# Patient Record
Sex: MALE | Race: WHITE | Employment: OTHER | URBAN - NONMETROPOLITAN AREA
[De-identification: names, ages, dates, MRNs, and addresses within clinical notes are randomized per-mention and may not be internally consistent; named-entity substitution may affect disease eponyms.]

---

## 2018-08-30 ENCOUNTER — OFFICE VISIT (OUTPATIENT)
Dept: PODIATRY | Age: 83
End: 2018-08-30
Payer: MEDICARE

## 2018-08-30 VITALS
SYSTOLIC BLOOD PRESSURE: 126 MMHG | HEART RATE: 68 BPM | RESPIRATION RATE: 20 BRPM | BODY MASS INDEX: 25.98 KG/M2 | DIASTOLIC BLOOD PRESSURE: 78 MMHG | HEIGHT: 69 IN | WEIGHT: 175.4 LBS

## 2018-08-30 DIAGNOSIS — G62.9 NEUROPATHY: ICD-10-CM

## 2018-08-30 DIAGNOSIS — L97.521 SKIN ULCER OF LEFT GREAT TOE, LIMITED TO BREAKDOWN OF SKIN (HCC): Primary | ICD-10-CM

## 2018-08-30 PROCEDURE — 99202 OFFICE O/P NEW SF 15 MIN: CPT | Performed by: PODIATRIST

## 2018-08-30 PROCEDURE — 97597 DBRDMT OPN WND 1ST 20 CM/<: CPT | Performed by: PODIATRIST

## 2018-08-30 RX ORDER — APIXABAN 5 MG/1
TABLET, FILM COATED ORAL
Refills: 8 | COMMUNITY
Start: 2018-08-20 | End: 2018-08-30 | Stop reason: SDUPTHER

## 2018-08-30 RX ORDER — LISINOPRIL 40 MG/1
TABLET ORAL
COMMUNITY
Start: 2018-08-27

## 2018-08-30 RX ORDER — ATORVASTATIN CALCIUM 40 MG/1
TABLET, FILM COATED ORAL
Refills: 2 | COMMUNITY
Start: 2018-07-25

## 2018-08-30 RX ORDER — METOPROLOL SUCCINATE 25 MG/1
TABLET, EXTENDED RELEASE ORAL
COMMUNITY
Start: 2018-01-08

## 2018-08-30 RX ORDER — FINASTERIDE 5 MG/1
5 TABLET, FILM COATED ORAL DAILY
COMMUNITY

## 2018-08-30 RX ORDER — LISINOPRIL 40 MG/1
40 TABLET ORAL
COMMUNITY
End: 2018-08-30 | Stop reason: SDUPTHER

## 2018-08-30 RX ORDER — ATORVASTATIN CALCIUM 40 MG/1
40 TABLET, FILM COATED ORAL
COMMUNITY
Start: 2018-04-30 | End: 2018-08-30 | Stop reason: SDUPTHER

## 2018-08-30 NOTE — PROGRESS NOTES
Subjective:    Amber Alcantar is a 80 y.o. male who presents with the ulceration of the foot. Patient has not been compliant with off loading. Noticed draiange at home a couple weeks ago. Patient relates pain is Absent . Pain is rated 0 out of 10 and is described as none. Currently denies F/C/N/V. Allergies   Allergen Reactions    Sulfa Antibiotics      Other reaction(s): Intolerance-unknown    Sulfamethoxazole      Other reaction(s): Intolerance-unknown    Trimethoprim      Other reaction(s): Intolerance-unknown    Warfarin      Other reaction(s): Bleeding (non-specific)       Past Medical History:   Diagnosis Date    Atrial fibrillation ([facility] [address]     HTN (hypertension)     Hyperlipemia        Prior to Admission medications    Medication Sig Start Date End Date Taking? Authorizing Provider   Calcium Carb-Cholecalciferol 600-800 MG-UNIT CHEW    Yes Historical Provider, MD   metoprolol succinate (TOPROL XL) 25 MG extended release tablet TAKE 1/2 TABLET BY MOUTH ONE TIME A DAY  1/8/18  Yes Historical Provider, MD   Omega-3 Fatty Acids (FISH OIL PO) Take 2 g by mouth   Yes Historical Provider, MD   atorvastatin (LIPITOR) 40 MG tablet TAKE 1 TABLET BY MOUTH ONE TIME A DAY 7/25/18  Yes Historical Provider, MD   lisinopril (PRINIVIL;ZESTRIL) 40 MG tablet  8/27/18  Yes Historical Provider, MD   apixaban (ELIQUIS) 5 MG TABS tablet Take by mouth 2 times daily   Yes Historical Provider, MD   finasteride (PROSCAR) 5 MG tablet Take 5 mg by mouth daily   Yes Historical Provider, MD       Social History   Substance Use Topics    Smoking status: Former Smoker    Smokeless tobacco: Never Used    Alcohol use No       Review of Systems: All 12 systems reviewed and pertinent positives noted above. Lower Extremity Physical Examination:     Vitals:   Vitals:    08/30/18 1504   BP: 126/78   Pulse: 68   Resp: 20     General: AAO x 3 in NAD.      Vascular: DP and PT pulses palpable 2/4, bilateral.  CFT <3 seconds, bilateral.

## 2018-08-30 NOTE — PATIENT INSTRUCTIONS
Discontinue previous dressing. Apply Silvadene, an antimicrobial cream which is a prescription medicine. Apply once or twice a day to your wound as instructed by your doctor. Cover with a dry dressing. Call with any concerns. SIGNS OF INFECTION  - Redness, swelling, skin hot  - Wound bed turns black or stringy yellow  - Foul odor  - Increased drainage or pus  - Increased pain  - Fever greater than 100F    CALL YOUR DOCTOR OR SEEK MEDICAL ATTENTION IF SIGNS OF INFECTION.   DO NOT WAIT UNTIL YOUR NEXT APPOINTMENT    Call the Wound Care Nurse with any other questions or concerns- [phone]

## 2018-09-14 ENCOUNTER — OFFICE VISIT (OUTPATIENT)
Dept: PODIATRY | Age: 83
End: 2018-09-14
Payer: MEDICARE

## 2018-09-14 VITALS
HEIGHT: 69 IN | HEART RATE: 60 BPM | WEIGHT: 176.8 LBS | TEMPERATURE: 97.6 F | SYSTOLIC BLOOD PRESSURE: 126 MMHG | DIASTOLIC BLOOD PRESSURE: 68 MMHG | BODY MASS INDEX: 26.19 KG/M2 | RESPIRATION RATE: 20 BRPM

## 2018-09-14 DIAGNOSIS — L97.521 SKIN ULCER OF LEFT GREAT TOE, LIMITED TO BREAKDOWN OF SKIN (HCC): ICD-10-CM

## 2018-09-14 DIAGNOSIS — G62.9 NEUROPATHY: Primary | ICD-10-CM

## 2018-09-14 PROCEDURE — 99212 OFFICE O/P EST SF 10 MIN: CPT | Performed by: PODIATRIST

## 2018-09-14 NOTE — PROGRESS NOTES
Subjective:    Amber Alcantar is a 80 y.o. male who presents with the ulceration of the foot. Patient has been compliant with tx. Patient relates pain is Absent . Pain is rated 0 out of 10 and is described as none. Currently denies F/C/N/V. Allergies   Allergen Reactions    Sulfa Antibiotics Other (See Comments)     Other reaction(s): Intolerance-unknown    Sulfamethoxazole      Other reaction(s): Intolerance-unknown    Sulfamethoxazole-Trimethoprim Other (See Comments)    Trimethoprim      Other reaction(s): Intolerance-unknown    Warfarin      Other reaction(s): Bleeding (non-specific)       Past Medical History:   Diagnosis Date    Atrial fibrillation ([facility] [address]     HTN (hypertension)     Hyperlipemia        Prior to Admission medications    Medication Sig Start Date End Date Taking? Authorizing Provider   Calcium Carb-Cholecalciferol 600-800 MG-UNIT CHEW    Yes Historical Provider, MD   metoprolol succinate (TOPROL XL) 25 MG extended release tablet TAKE 1/2 TABLET BY MOUTH ONE TIME A DAY  1/8/18  Yes Historical Provider, MD   Omega-3 Fatty Acids (FISH OIL PO) Take 2 g by mouth   Yes Historical Provider, MD   atorvastatin (LIPITOR) 40 MG tablet TAKE 1 TABLET BY MOUTH ONE TIME A DAY 7/25/18  Yes Historical Provider, MD   lisinopril (PRINIVIL;ZESTRIL) 40 MG tablet  8/27/18  Yes Historical Provider, MD   apixaban (ELIQUIS) 5 MG TABS tablet Take by mouth 2 times daily   Yes Historical Provider, MD   finasteride (PROSCAR) 5 MG tablet Take 5 mg by mouth daily   Yes Historical Provider, MD       Social History   Substance Use Topics    Smoking status: Former Smoker    Smokeless tobacco: Never Used    Alcohol use No       Review of Systems: All 12 systems reviewed and pertinent positives noted above. Lower Extremity Physical Examination:     Vitals:   Vitals:    09/14/18 0904   BP: 126/68   Pulse: 60   Resp: 20   Temp: 97.6 °F (36.4 °C)     General: AAO x 3 in NAD.      Vascular: DP and PT pulses palpable 2/4, bilateral.  CFT <3 seconds, bilateral.  Hair growth present to the level of the digits, bilateral.  Edema present, bilateral.  Varicosities present, bilateral. Erythema absent, bilateral. Distal Rubor absent bilateral.  Temperature within normal limits bilateral. Hyperpigmentation present bilateral. Atrophic skin yes. Neurological: Sensation Impaired to light touch to level of digits, bilateral.  Protective sensation intact  0/10 sites via 5.07/10g [place]-Lori Monofilament, bilateral.  negative Tinel's, bilateral.  negative Valleix sign, bilateral.  Vibratory abnormal  bilateral.  Reflexes Decreased bilateral.  Paresthesias positive. Dysthesias negative. Sharp/dull abnormal  bilateral.    Musculoskeletal: Muscle strength 5/5, bilateral.  Pain absent upon palpation bilateral. Normal medial longitudinal arch, bilateral.  Ankle ROM within normal limits,bilateral.  1st MPJ ROM within normal limits, bilateral.  Dorsally contracted digits absent. No other foot deformities. Integument:   Open lesion absent, Left. Sub L hallux ulcer resolved. Interdigital maceration absent to web spaces , Bilateral.  Nails left 1, 2, 3, 4, 5 and right  1, 2, 3, 4, 5 thickened, dystrophic and crumbly, discolored with subungual debris. Fissures absent, Bilateral. Hyperkeratotic tissue is present. Seen sub l hallux       Asessment: Patient is a 80 y.o. male with:    Diagnosis Orders   1. Neuropathy     2. Skin ulcer of left great toe, limited to breakdown of skin ([address]       Ulcer Classification:   ulcer grade 1 C resolved  IDSA  no infection. Plan:   Patient examined and evaluated. Diabetic foot education and exam.  Current condition and treatment options discussed in detail. No debridement needed of the ulcer today. The ulcer is completley resolved at this time. Pt was educated about means of prevention and risk factor modification. Pt should return to the clinic PRN if any further ulceration should occur.

## 2018-12-14 ENCOUNTER — OFFICE VISIT (OUTPATIENT)
Dept: PODIATRY | Age: 83
End: 2018-12-14
Payer: MEDICARE

## 2018-12-14 VITALS
WEIGHT: 176 LBS | DIASTOLIC BLOOD PRESSURE: 70 MMHG | HEIGHT: 69 IN | HEART RATE: 80 BPM | BODY MASS INDEX: 26.07 KG/M2 | SYSTOLIC BLOOD PRESSURE: 128 MMHG

## 2018-12-14 DIAGNOSIS — G62.9 NEUROPATHY: Primary | ICD-10-CM

## 2018-12-14 DIAGNOSIS — L84 CORN OF TOE: ICD-10-CM

## 2018-12-14 DIAGNOSIS — B35.1 DERMATOPHYTOSIS OF NAIL: ICD-10-CM

## 2018-12-14 PROCEDURE — 99999 PR OFFICE/OUTPT VISIT,PROCEDURE ONLY: CPT | Performed by: PODIATRIST

## 2018-12-14 PROCEDURE — 11055 PARING/CUTG B9 HYPRKER LES 1: CPT | Performed by: PODIATRIST

## 2018-12-14 PROCEDURE — 11721 DEBRIDE NAIL 6 OR MORE: CPT | Performed by: PODIATRIST

## 2019-03-14 ENCOUNTER — OFFICE VISIT (OUTPATIENT)
Dept: PODIATRY | Age: 84
End: 2019-03-14
Payer: MEDICARE

## 2019-03-14 VITALS
BODY MASS INDEX: 26.36 KG/M2 | DIASTOLIC BLOOD PRESSURE: 68 MMHG | SYSTOLIC BLOOD PRESSURE: 118 MMHG | WEIGHT: 178 LBS | HEIGHT: 69 IN | HEART RATE: 74 BPM

## 2019-03-14 DIAGNOSIS — G62.9 NEUROPATHY: Primary | ICD-10-CM

## 2019-03-14 DIAGNOSIS — L84 CORNS AND CALLOSITIES: ICD-10-CM

## 2019-03-14 DIAGNOSIS — B35.1 DERMATOPHYTOSIS OF NAIL: ICD-10-CM

## 2019-03-14 PROCEDURE — 11721 DEBRIDE NAIL 6 OR MORE: CPT | Performed by: PODIATRIST

## 2019-03-14 PROCEDURE — 11056 PARNG/CUTG B9 HYPRKR LES 2-4: CPT | Performed by: PODIATRIST

## 2019-03-14 PROCEDURE — 99999 PR OFFICE/OUTPT VISIT,PROCEDURE ONLY: CPT | Performed by: PODIATRIST

## 2019-05-23 ENCOUNTER — OFFICE VISIT (OUTPATIENT)
Dept: PODIATRY | Age: 84
End: 2019-05-23
Payer: MEDICARE

## 2019-05-23 VITALS
HEART RATE: 68 BPM | WEIGHT: 177 LBS | HEIGHT: 69 IN | BODY MASS INDEX: 26.22 KG/M2 | DIASTOLIC BLOOD PRESSURE: 60 MMHG | SYSTOLIC BLOOD PRESSURE: 115 MMHG

## 2019-05-23 DIAGNOSIS — B35.1 DERMATOPHYTOSIS OF NAIL: ICD-10-CM

## 2019-05-23 DIAGNOSIS — G62.9 NEUROPATHY: Primary | ICD-10-CM

## 2019-05-23 DIAGNOSIS — L84 CORNS AND CALLOSITIES: ICD-10-CM

## 2019-05-23 PROCEDURE — 11721 DEBRIDE NAIL 6 OR MORE: CPT | Performed by: PODIATRIST

## 2019-05-23 PROCEDURE — 11056 PARNG/CUTG B9 HYPRKR LES 2-4: CPT | Performed by: PODIATRIST

## 2019-05-23 PROCEDURE — 99999 PR OFFICE/OUTPT VISIT,PROCEDURE ONLY: CPT | Performed by: PODIATRIST

## 2019-05-23 NOTE — PROGRESS NOTES
Foot Care Worksheet  PCP: Amber Geronimo MD  Last visit: 2/15/19        Nail description:  Thick , Yellow , Crumbly , Marked limitation of ambulation     Pain resulting from thickened and dystrophy of nail plate No    Nails involved  Right   1, 2, 3, 4, 5  (T5-T9)  Left     1, 2, 3, 4, 5  (TA-T4)    Q7 1 Class A Finding - Non traumatic amputation of foot No    Q8 2 Class B Findings - Absent DP pulse No, Absent PT pulse No, Advanced tropic changes (3 required) Yes    Decrease hair growth Yes, Nail changes/thickening Yes, Pigmented changes/discoloration Yes, Skin texture (thin, shiny) No, Skin color (rubor/redness) No    Q9 1 Class B and 2 Class C Findings  Claudication No, Temperature change Yes, Paresthesia No, Burning No, Edema Yes

## 2019-05-23 NOTE — PROGRESS NOTES
Subjective:  Patient presents to [facility] today for routine foot care. Patient denies any new problems with their feet. Allergies   Allergen Reactions    Sulfa Antibiotics Other (See Comments)     Other reaction(s): Intolerance-unknown    Sulfamethoxazole      Other reaction(s): Intolerance-unknown    Sulfamethoxazole-Trimethoprim Other (See Comments)    Trimethoprim      Other reaction(s): Intolerance-unknown    Warfarin      Other reaction(s): Bleeding (non-specific)       Past Medical History:   Diagnosis Date    Atrial fibrillation ([address]     HTN (hypertension)     Hyperlipemia        Prior to Admission medications    Medication Sig Start Date End Date Taking? Authorizing Provider   Calcium Carb-Cholecalciferol 600-800 MG-UNIT CHEW    Yes Historical Provider, MD   metoprolol succinate (TOPROL XL) 25 MG extended release tablet TAKE 1/2 TABLET BY MOUTH ONE TIME A DAY  1/8/18  Yes Historical Provider, MD   Omega-3 Fatty Acids (FISH OIL PO) Take 2 g by mouth   Yes Historical Provider, MD   atorvastatin (LIPITOR) 40 MG tablet TAKE 1 TABLET BY MOUTH ONE TIME A DAY 7/25/18  Yes Historical Provider, MD   lisinopril (PRINIVIL;ZESTRIL) 40 MG tablet  8/27/18  Yes Historical Provider, MD   apixaban (ELIQUIS) 5 MG TABS tablet Take by mouth 2 times daily   Yes Historical Provider, MD   finasteride (PROSCAR) 5 MG tablet Take 5 mg by mouth daily   Yes Historical Provider, MD       Social History     Tobacco Use    Smoking status: Former Smoker    Smokeless tobacco: Never Used   Substance Use Topics    Alcohol use: No     Review of Systems: All 12 systems reviewed and pertinent positives noted above.   Objective:  Vascular: DP and PT pulses palpable 2/4, bilateral.  CFT <3 seconds, bilateral.  Hair growth present to the level of the digits, bilateral.  Edema absent, bilateral.  Varicosities absent, bilateral. Erythema absent, bilateral. Distal Rubor absent bilateral.  Temperature within normal limits bilateral. Hyperpigmentation absent bilateral. No atrophic skin. Neurological: Sensation Impaired to light touch to level of digits, bilateral.  Protective sensation intact  0/10 sites via 5.07/10g [place]-Lori Monofilament, bilateral.  negative Tinel's, bilateral.  negative Valleix sign, bilateral.  Vibratory abnormal  bilateral.  Reflexes Decreased bilateral.  Paresthesias positive. Dysthesias negative. Sharp/dull abnormal bilateral.    Integument:  Nails left 1, 2, 3, 4, 5 and right 1, 2, 3, 4, 5 thickened, dystrophic and crumbly, discolored with subungual debris. Hyperkeratotic tissue is present. Seen sub L hallux and medial R 5th toe    Assessment:   Diagnosis Orders   1. Neuropathy     2. Dermatophytosis of nail     3. Corns and callosities           Plan:  Nails as mentioned above debrided in length and thickness. Paring of 2 benign hyperkeratotic lesions took place with #10 blade or tissue nippers. Patient advised OTC methods for treatment of the mycotic nails. Patient will follow up in 10 weeks.

## 2019-10-03 ENCOUNTER — OFFICE VISIT (OUTPATIENT)
Dept: PODIATRY | Age: 84
End: 2019-10-03
Payer: MEDICARE

## 2019-10-03 VITALS
DIASTOLIC BLOOD PRESSURE: 70 MMHG | WEIGHT: 176 LBS | HEIGHT: 69 IN | BODY MASS INDEX: 26.07 KG/M2 | SYSTOLIC BLOOD PRESSURE: 112 MMHG | HEART RATE: 76 BPM

## 2019-10-03 DIAGNOSIS — B35.1 DERMATOPHYTOSIS OF NAIL: ICD-10-CM

## 2019-10-03 DIAGNOSIS — G60.8 HEREDITARY SENSORY NEUROPATHY: Primary | ICD-10-CM

## 2019-10-03 DIAGNOSIS — L84 CORNS AND CALLOSITIES: ICD-10-CM

## 2019-10-03 PROCEDURE — 99999 PR OFFICE/OUTPT VISIT,PROCEDURE ONLY: CPT | Performed by: PODIATRIST

## 2019-10-03 PROCEDURE — 11056 PARNG/CUTG B9 HYPRKR LES 2-4: CPT | Performed by: PODIATRIST

## 2019-10-03 PROCEDURE — 11721 DEBRIDE NAIL 6 OR MORE: CPT | Performed by: PODIATRIST

## 2019-12-12 ENCOUNTER — OFFICE VISIT (OUTPATIENT)
Dept: PODIATRY | Age: 84
End: 2019-12-12
Payer: MEDICARE

## 2019-12-12 VITALS
HEART RATE: 60 BPM | HEIGHT: 69 IN | DIASTOLIC BLOOD PRESSURE: 80 MMHG | WEIGHT: 176 LBS | SYSTOLIC BLOOD PRESSURE: 132 MMHG | BODY MASS INDEX: 26.07 KG/M2 | RESPIRATION RATE: 20 BRPM

## 2019-12-12 DIAGNOSIS — B35.1 DERMATOPHYTOSIS OF NAIL: ICD-10-CM

## 2019-12-12 DIAGNOSIS — G60.8 HEREDITARY SENSORY NEUROPATHY: Primary | ICD-10-CM

## 2019-12-12 PROCEDURE — 99999 PR OFFICE/OUTPT VISIT,PROCEDURE ONLY: CPT | Performed by: PODIATRIST

## 2019-12-12 PROCEDURE — 11721 DEBRIDE NAIL 6 OR MORE: CPT | Performed by: PODIATRIST

## 2020-06-04 ENCOUNTER — OFFICE VISIT (OUTPATIENT)
Dept: PODIATRY | Age: 85
End: 2020-06-04
Payer: MEDICARE

## 2020-06-04 VITALS
BODY MASS INDEX: 25.1 KG/M2 | SYSTOLIC BLOOD PRESSURE: 124 MMHG | DIASTOLIC BLOOD PRESSURE: 74 MMHG | RESPIRATION RATE: 20 BRPM | WEIGHT: 170 LBS | HEART RATE: 68 BPM

## 2020-06-04 PROCEDURE — 99213 OFFICE O/P EST LOW 20 MIN: CPT | Performed by: PODIATRIST

## 2020-06-04 PROCEDURE — 99214 OFFICE O/P EST MOD 30 MIN: CPT

## 2020-06-04 PROCEDURE — 97597 DBRDMT OPN WND 1ST 20 CM/<: CPT | Performed by: PODIATRIST

## 2020-06-04 PROCEDURE — 11721 DEBRIDE NAIL 6 OR MORE: CPT | Performed by: PODIATRIST

## 2020-06-04 NOTE — PROGRESS NOTES
Subjective:    Mariposa Burns is a 80 y.o. male who presents with the ulceration of the L toe. Pt has not been seen and missed appt due to COVID 19 pandemic. Lali Rivera Patient has not been compliant with off loading. Patient relates pain is Absent . Pain is rated 0 out of 10 and is described as none. Currently denies F/C/N/V. Pt also presents for routine foot care. Overall diabetic control is not changed. Allergies   Allergen Reactions    Sulfa Antibiotics Other (See Comments)     Other reaction(s): Intolerance-unknown  Other reaction(s): Unknown    Sulfamethoxazole      Other reaction(s): Intolerance-unknown    Sulfamethoxazole-Trimethoprim Other (See Comments)     Other reaction(s): Unknown    Trimethoprim      Other reaction(s): Intolerance-unknown    Warfarin      Other reaction(s): Bleeding (non-specific)       Past Medical History:   Diagnosis Date    Atrial fibrillation ([facility] [address]     HTN (hypertension)     Hyperlipemia        Prior to Admission medications    Medication Sig Start Date End Date Taking? Authorizing Provider   mupirocin (BACTROBAN) 2 % ointment Apply topically 2 times daily.  6/4/20  Yes Nishant Ellis DPM   Calcium Carb-Cholecalciferol 600-800 MG-UNIT CHEW    Yes Historical Provider, MD   metoprolol succinate (TOPROL XL) 25 MG extended release tablet TAKE 1/2 TABLET BY MOUTH ONE TIME A DAY  1/8/18  Yes Historical Provider, MD   Omega-3 Fatty Acids (FISH OIL PO) Take 2 g by mouth   Yes Historical Provider, MD   atorvastatin (LIPITOR) 40 MG tablet TAKE 1 TABLET BY MOUTH ONE TIME A DAY 7/25/18  Yes Historical Provider, MD   lisinopril (PRINIVIL;ZESTRIL) 40 MG tablet  8/27/18  Yes Historical Provider, MD   apixaban (ELIQUIS) 5 MG TABS tablet Take by mouth 2 times daily   Yes Historical Provider, MD   finasteride (PROSCAR) 5 MG tablet Take 5 mg by mouth daily   Yes Historical Provider, MD       Social History     Tobacco Use    Smoking status: Former Smoker    Smokeless tobacco: Never Used Substance Use Topics    Alcohol use: No       ROS: All 14 ROS systems reviewed and pertinent positives noted above, all others negative. Lower Extremity Physical Examination:     Vitals:   Vitals:    06/04/20 1608   BP: 124/74   Pulse: 68   Resp: 20     General: AAO x 3 in NAD. Vascular: DP and PT pulses palpable 2/4, bilateral.  CFT <3 seconds, bilateral.  Hair growth absent to the level of the digits, bilateral.  Edema present, bilateral.  Varicosities present, bilateral. Erythema absent, bilateral. Distal Rubor absent bilateral.  Temperature decreased bilateral. Hyperpigmentation present bilateral. Atrophic skin yes. Neurological: Sensation Impaired to light touch to level of digits, bilateral.  Protective sensation intact  0/10 sites via 5.07/10g [place]-Lori Monofilament, bilateral.  negative Tinel's, bilateral.  negative Valleix sign, bilateral.  Vibratory abnormal  bilateral.  Reflexes Decreased bilateral.  Paresthesias positive. Dysthesias negative. Sharp/dull abnormal  bilateral.    Musculoskeletal: Muscle strength 5/5, bilateral.  Pain absent upon palpation bilateral. Normal medial longitudinal arch, bilateral.  Ankle ROM within normal limits,bilateral.  1st MPJ ROM within normal limits, bilateral.  Dorsally contracted digits absent. No other foot deformities. Integument:   Open lesion present, Left. Ulcer sub L hallux, 0.9x0.4x0.2cm, aggressive postivie callous and fibrin, healthy red granular base, no probing no undermining no malodor. No surrounding signs of infx. Interdigital maceration absent to web spaces , Bilateral.  Nails left 1, 2, 3, 4, 5 and right  1, 2, 3, 4, 5 thickened, dystrophic and crumbly, discolored with subungual debris. Fissures absent, Bilateral. Hyperkeratotic tissue is present. Asessment: Patient is a 80 y.o. male with:    Diagnosis Orders   1.  Skin ulcer of left great toe, limited to breakdown of skin ([facility])  FL DEBRIDEMENT OPEN WOUND 20 SQ CM<   2.

## 2020-06-17 ENCOUNTER — OFFICE VISIT (OUTPATIENT)
Dept: PODIATRY | Age: 85
End: 2020-06-17
Payer: MEDICARE

## 2020-06-17 VITALS
SYSTOLIC BLOOD PRESSURE: 122 MMHG | HEART RATE: 68 BPM | BODY MASS INDEX: 25.18 KG/M2 | TEMPERATURE: 97.3 F | WEIGHT: 170 LBS | HEIGHT: 69 IN | DIASTOLIC BLOOD PRESSURE: 74 MMHG

## 2020-06-17 PROCEDURE — 97597 DBRDMT OPN WND 1ST 20 CM/<: CPT | Performed by: PODIATRIST

## 2020-06-17 PROCEDURE — 99999 PR OFFICE/OUTPT VISIT,PROCEDURE ONLY: CPT | Performed by: PODIATRIST

## 2020-06-17 PROCEDURE — 99213 OFFICE O/P EST LOW 20 MIN: CPT

## 2020-06-17 NOTE — PROGRESS NOTES
Subjective:    Dajuan Griffin is a 80 y.o. male who presents with the ulceration of the L toe. Patient has not been compliant with off loading. Patient relates pain is Absent . Pain is rated 0 out of 10 and is described as none. Currently denies F/C/N/V. Overall diabetic control is not changed. Allergies   Allergen Reactions    Sulfa Antibiotics Other (See Comments)     Other reaction(s): Intolerance-unknown  Other reaction(s): Unknown    Sulfamethoxazole      Other reaction(s): Intolerance-unknown    Sulfamethoxazole-Trimethoprim Other (See Comments)     Other reaction(s): Unknown    Trimethoprim      Other reaction(s): Intolerance-unknown    Warfarin      Other reaction(s): Bleeding (non-specific)       Past Medical History:   Diagnosis Date    Atrial fibrillation ([facility] [address]     HTN (hypertension)     Hyperlipemia        Prior to Admission medications    Medication Sig Start Date End Date Taking? Authorizing Provider   mupirocin (BACTROBAN) 2 % ointment Apply topically 2 times daily.  6/4/20  Yes Edgar Perez DPM   Calcium Carb-Cholecalciferol 600-800 MG-UNIT CHEW    Yes Historical Provider, MD   metoprolol succinate (TOPROL XL) 25 MG extended release tablet TAKE 1/2 TABLET BY MOUTH ONE TIME A DAY  1/8/18  Yes Historical Provider, MD   Omega-3 Fatty Acids (FISH OIL PO) Take 2 g by mouth   Yes Historical Provider, MD   atorvastatin (LIPITOR) 40 MG tablet TAKE 1 TABLET BY MOUTH ONE TIME A DAY 7/25/18  Yes Historical Provider, MD   lisinopril (PRINIVIL;ZESTRIL) 40 MG tablet  8/27/18  Yes Historical Provider, MD   apixaban (ELIQUIS) 5 MG TABS tablet Take by mouth 2 times daily   Yes Historical Provider, MD   finasteride (PROSCAR) 5 MG tablet Take 5 mg by mouth daily   Yes Historical Provider, MD       Social History     Tobacco Use    Smoking status: Former Smoker    Smokeless tobacco: Never Used   Substance Use Topics    Alcohol use: No       ROS: All 14 ROS systems reviewed and pertinent positives noted above, all others negative. Lower Extremity Physical Examination:     Vitals:   Vitals:    06/17/20 1505   BP: 122/74   Pulse: 68   Temp: 97.3 °F (36.3 °C)     General: AAO x 3 in NAD. Vascular: DP and PT pulses palpable 2/4, bilateral.  CFT <3 seconds, bilateral.  Hair growth absent to the level of the digits, bilateral.  Edema present, bilateral.  Varicosities present, bilateral. Erythema absent, bilateral. Distal Rubor absent bilateral.  Temperature decreased bilateral. Hyperpigmentation present bilateral. Atrophic skin yes. Neurological: Sensation Impaired to light touch to level of digits, bilateral.  Protective sensation intact  0/10 sites via 5.07/10g [place]-Lori Monofilament, bilateral.  negative Tinel's, bilateral.  negative Valleix sign, bilateral.  Vibratory abnormal  bilateral.  Reflexes Decreased bilateral.  Paresthesias positive. Dysthesias negative. Sharp/dull abnormal  bilateral.    Musculoskeletal: Muscle strength 5/5, bilateral.  Pain absent upon palpation bilateral. Normal medial longitudinal arch, bilateral.  Ankle ROM within normal limits,bilateral.  1st MPJ ROM within normal limits, bilateral.  Dorsally contracted digits absent. No other foot deformities. Integument:   Open lesion present, Left. Ulcer sub L hallux, postivie callous and fibrin, healthy red granular base, no probing no undermining no malodor. No surrounding signs of infx. Interdigital maceration absent to web spaces , Bilateral.  Nails left 1, 2, 3, 4, 5 and right  1, 2, 3, 4, 5 thickened, dystrophic and crumbly, discolored with subungual debris. Fissures absent, Bilateral. Hyperkeratotic tissue is present.   Wound 08/30/18 left great toe  (Active)   Wound Width (cm) 0.2 cm 8/30/2018  3:48 PM   Wound Depth (cm)  0.1 8/30/2018  3:48 PM   Calculated Wound Size (cm^2) (l*w) 0.04 cm^2 8/30/2018  3:48 PM   Number of days: 657       Wound 06/04/20 # left great toe (Active)   Dressing/Treatment Antibacterial

## 2020-06-17 NOTE — PATIENT INSTRUCTIONS
Continue with bactroban ointment to left great toe, apply once per day, keep covered with a bandage at all times.   OK to leave bandage off during showers or bathing, do not soak toe wound   Follow up with Dr Ashley Porras in 1-2 weeks

## 2020-07-01 ENCOUNTER — OFFICE VISIT (OUTPATIENT)
Dept: PODIATRY | Age: 85
End: 2020-07-01
Payer: MEDICARE

## 2020-07-01 VITALS
SYSTOLIC BLOOD PRESSURE: 122 MMHG | DIASTOLIC BLOOD PRESSURE: 64 MMHG | HEART RATE: 60 BPM | BODY MASS INDEX: 25.08 KG/M2 | WEIGHT: 169.8 LBS | RESPIRATION RATE: 20 BRPM

## 2020-07-01 PROCEDURE — 99212 OFFICE O/P EST SF 10 MIN: CPT | Performed by: PODIATRIST

## 2020-07-01 PROCEDURE — 99213 OFFICE O/P EST LOW 20 MIN: CPT

## 2020-07-01 NOTE — PROGRESS NOTES
Subjective:    Zoë Baeza is a 80 y.o. male who presents with the ulceration of the L toe. Patient has not been compliant with off loading. Patient relates pain is Absent . Pain is rated 0 out of 10 and is described as none. Currently denies F/C/N/V. Overall diabetic control is not changed. Allergies   Allergen Reactions    Sulfa Antibiotics Other (See Comments)     Other reaction(s): Intolerance-unknown  Other reaction(s): Unknown    Sulfamethoxazole      Other reaction(s): Intolerance-unknown    Sulfamethoxazole-Trimethoprim Other (See Comments)     Other reaction(s): Unknown    Trimethoprim      Other reaction(s): Intolerance-unknown    Warfarin      Other reaction(s): Bleeding (non-specific)       Past Medical History:   Diagnosis Date    Atrial fibrillation ([facility] [address]     HTN (hypertension)     Hyperlipemia        Prior to Admission medications    Medication Sig Start Date End Date Taking? Authorizing Provider   mupirocin (BACTROBAN) 2 % ointment Apply topically 2 times daily.  6/4/20  Yes Nancie Stains, DPM   Calcium Carb-Cholecalciferol 600-800 MG-UNIT CHEW    Yes Historical Provider, MD   metoprolol succinate (TOPROL XL) 25 MG extended release tablet TAKE 1/2 TABLET BY MOUTH ONE TIME A DAY  1/8/18  Yes Historical Provider, MD   Omega-3 Fatty Acids (FISH OIL PO) Take 2 g by mouth   Yes Historical Provider, MD   atorvastatin (LIPITOR) 40 MG tablet TAKE 1 TABLET BY MOUTH ONE TIME A DAY 7/25/18  Yes Historical Provider, MD   lisinopril (PRINIVIL;ZESTRIL) 40 MG tablet  8/27/18  Yes Historical Provider, MD   apixaban (ELIQUIS) 5 MG TABS tablet Take by mouth 2 times daily   Yes Historical Provider, MD   finasteride (PROSCAR) 5 MG tablet Take 5 mg by mouth daily   Yes Historical Provider, MD       Social History     Tobacco Use    Smoking status: Former Smoker    Smokeless tobacco: Never Used   Substance Use Topics    Alcohol use: No       ROS: All 14 ROS systems reviewed and pertinent positives noted above, all others negative. Lower Extremity Physical Examination:     Vitals:   Vitals:    07/01/20 1348   BP: 122/64   Pulse: 60   Resp: 20     General: AAO x 3 in NAD. Vascular: DP and PT pulses palpable 2/4, bilateral.  CFT <3 seconds, bilateral.  Hair growth absent to the level of the digits, bilateral.  Edema present, bilateral.  Varicosities present, bilateral. Erythema absent, bilateral. Distal Rubor absent bilateral.  Temperature decreased bilateral. Hyperpigmentation present bilateral. Atrophic skin yes. Neurological: Sensation Impaired to light touch to level of digits, bilateral.  Protective sensation intact  0/10 sites via 5.07/10g [place]-Lori Monofilament, bilateral.  negative Tinel's, bilateral.  negative Valleix sign, bilateral.  Vibratory abnormal  bilateral.  Reflexes Decreased bilateral.  Paresthesias positive. Dysthesias negative. Sharp/dull abnormal  bilateral.    Musculoskeletal: Muscle strength 5/5, bilateral.  Pain absent upon palpation bilateral. Normal medial longitudinal arch, bilateral.  Ankle ROM within normal limits,bilateral.  1st MPJ ROM decreased, bilateral.  Dorsally contracted digits absent. No other foot deformities. Integument:   Open lesion present, Left. Ulcer sub L hallux,resolved with new epithelail skin. . Interdigital maceration absent to web spaces , Bilateral.  Nails left 1, 2, 3, 4, 5 and right  1, 2, 3, 4, 5 thickened, dystrophic and crumbly, discolored with subungual debris. Fissures absent, Bilateral. Hyperkeratotic tissue is present  Asessment: Patient is a 80 y.o. male with:    Diagnosis Orders   1. Skin ulcer of left great toe, limited to breakdown of skin ([address]     2. Hereditary sensory neuropathy       Ulcer Classification:  Diabetic ulcer grade 1 C resolved. IDSA  no infection. Plan:   Patient examined and evaluated. Diabetic foot education and exam.  Current condition and treatment options discussed in detail.    No debridement needed of the ulcer today. The ulcer is completley resolved at this time. Pt was educated about means of prevention and risk factor modification. Pt should return to the clinic PRN if any further ulceration should occur. Contact clinic with any questions/problems/concerns or new signs of infection. Follow-up at [facility] in future for routine callous care.

## 2020-08-13 ENCOUNTER — OFFICE VISIT (OUTPATIENT)
Dept: PODIATRY | Age: 85
End: 2020-08-13
Payer: MEDICARE

## 2020-08-13 VITALS
HEART RATE: 68 BPM | RESPIRATION RATE: 20 BRPM | WEIGHT: 172 LBS | DIASTOLIC BLOOD PRESSURE: 66 MMHG | BODY MASS INDEX: 25.4 KG/M2 | SYSTOLIC BLOOD PRESSURE: 124 MMHG

## 2020-08-13 PROCEDURE — 11721 DEBRIDE NAIL 6 OR MORE: CPT | Performed by: PODIATRIST

## 2020-08-13 PROCEDURE — 99999 PR OFFICE/OUTPT VISIT,PROCEDURE ONLY: CPT | Performed by: PODIATRIST

## 2020-08-13 PROCEDURE — 11055 PARING/CUTG B9 HYPRKER LES 1: CPT | Performed by: PODIATRIST

## 2020-08-13 NOTE — PROGRESS NOTES
Subjective:  Patient presents to [facility] today for routine foot care. Patient denies any new problems with their feet. Allergies   Allergen Reactions    Sulfa Antibiotics Other (See Comments)     Other reaction(s): Intolerance-unknown  Other reaction(s): Unknown    Sulfamethoxazole      Other reaction(s): Intolerance-unknown    Sulfamethoxazole-Trimethoprim Other (See Comments)     Other reaction(s): Unknown    Trimethoprim      Other reaction(s): Intolerance-unknown    Warfarin      Other reaction(s): Bleeding (non-specific)       Past Medical History:   Diagnosis Date    Atrial fibrillation ([address]     HTN (hypertension)     Hyperlipemia        Prior to Admission medications    Medication Sig Start Date End Date Taking? Authorizing Provider   mupirocin (BACTROBAN) 2 % ointment Apply topically 2 times daily. 6/4/20  Yes Neelam Lindsay DPM   Calcium Carb-Cholecalciferol 600-800 MG-UNIT CHEW    Yes Historical Provider, MD   metoprolol succinate (TOPROL XL) 25 MG extended release tablet TAKE 1/2 TABLET BY MOUTH ONE TIME A DAY  1/8/18  Yes Historical Provider, MD   Omega-3 Fatty Acids (FISH OIL PO) Take 2 g by mouth   Yes Historical Provider, MD   atorvastatin (LIPITOR) 40 MG tablet TAKE 1 TABLET BY MOUTH ONE TIME A DAY 7/25/18  Yes Historical Provider, MD   lisinopril (PRINIVIL;ZESTRIL) 40 MG tablet  8/27/18  Yes Historical Provider, MD   apixaban (ELIQUIS) 5 MG TABS tablet Take by mouth 2 times daily   Yes Historical Provider, MD   finasteride (PROSCAR) 5 MG tablet Take 5 mg by mouth daily   Yes Historical Provider, MD       Social History     Tobacco Use    Smoking status: Former Smoker    Smokeless tobacco: Never Used   Substance Use Topics    Alcohol use: No     Review of Systems: All 12 systems reviewed and pertinent positives noted above.   Objective:  Vascular: DP and PT pulses palpable 2/4, bilateral.  CFT <3 seconds, bilateral.  Hair growth present to the level of the digits, bilateral.  Edema absent, bilateral.  Varicosities absent, bilateral. Erythema absent, bilateral. Distal Rubor absent bilateral.  Temperature within normal limits bilateral. Hyperpigmentation absent bilateral. No atrophic skin. Neurological: Sensation Impaired to light touch to level of digits, bilateral.  Protective sensation intact  0/10 sites via 5.07/10g [place]-Lori Monofilament, bilateral.  negative Tinel's, bilateral.  negative Valleix sign, bilateral.  Vibratory abnormal  bilateral.  Reflexes Decreased bilateral.  Paresthesias positive. Dysthesias negative. Sharp/dull abnormal bilateral.    Integument:  Nails left 1, 2, 3, 4, 5 and right 1, 2, 3, 4, 5 thickened, dystrophic and crumbly, discolored with subungual debris. Hyperkeratotic tissue is present. Seen sub L hallux     Assessment:   Diagnosis Orders   1. Hereditary sensory neuropathy     2. Corns and callosities     3. Dermatophytosis of nail           Plan:  All nails as mentioned above debrided in length and thickness. Paring of 1 benign hyperkeratotic lesions (as listed above) took place with #10 blade or tissue nippers. Patient advised OTC methods for treatment of the mycotic nails. Patient will follow up in 10 weeks.

## 2020-08-13 NOTE — PROGRESS NOTES
Foot Care Worksheet  PCP: Altagracia Reynolds MD  Last visit: 08 / 05 / 2020        Nail description:  Thick , Yellow , Crumbly , Marked limitation of ambulation     Pain resulting from thickened and dystrophy of nail plate No    Nails involved  Right   1, 2, 3, 4, 5  (T5-T9)  Left     1, 2, 3, 4, 5  (TA-T4)    Q7 1 Class A Finding - Non traumatic amputation of foot No    Q8 2 Class B Findings - Absent DP pulse No, Absent PT pulse No, Advanced tropic changes (3 required) Yes    Decrease hair growth Yes, Nail changes/thickening Yes, Pigmented changes/discoloration Yes, Skin texture (thin, shiny) No, Skin color (rubor/redness) No    Q9 1 Class B and 2 Class C Findings  Claudication No, Temperature change Yes, Paresthesia No, Burning No, Edema Yes

## 2020-10-22 ENCOUNTER — OFFICE VISIT (OUTPATIENT)
Dept: PODIATRY | Age: 85
End: 2020-10-22
Payer: MEDICARE

## 2020-10-22 VITALS
HEART RATE: 67 BPM | OXYGEN SATURATION: 97 % | WEIGHT: 172.6 LBS | HEIGHT: 70 IN | DIASTOLIC BLOOD PRESSURE: 74 MMHG | SYSTOLIC BLOOD PRESSURE: 130 MMHG | BODY MASS INDEX: 24.71 KG/M2

## 2020-10-22 PROCEDURE — 11055 PARING/CUTG B9 HYPRKER LES 1: CPT | Performed by: PODIATRIST

## 2020-10-22 PROCEDURE — 99999 PR OFFICE/OUTPT VISIT,PROCEDURE ONLY: CPT | Performed by: PODIATRIST

## 2020-10-22 PROCEDURE — 11721 DEBRIDE NAIL 6 OR MORE: CPT | Performed by: PODIATRIST

## 2020-10-22 NOTE — PROGRESS NOTES
Foot Care Worksheet  PCP: Floyd Early MD  Last visit: 08 / 05 / 2020        Nail description:  Thick , Yellow , Crumbly , Marked limitation of ambulation     Pain resulting from thickened and dystrophy of nail plate No    Nails involved  Right   1, 2, 3, 4, 5  (T5-T9)  Left     1, 2, 3, 4, 5  (TA-T4)    Q7 1 Class A Finding - Non traumatic amputation of foot No    Q8 2 Class B Findings - Absent DP pulse No, Absent PT pulse No, Advanced tropic changes (3 required) Yes    Decrease hair growth Yes, Nail changes/thickening Yes, Pigmented changes/discoloration Yes, Skin texture (thin, shiny) No, Skin color (rubor/redness) No    Q9 1 Class B and 2 Class C Findings  Claudication No, Temperature change Yes, Paresthesia No, Burning No, Edema Yes

## 2021-01-19 ENCOUNTER — OFFICE VISIT (OUTPATIENT)
Dept: PODIATRY | Age: 86
End: 2021-01-19
Payer: MEDICARE

## 2021-01-19 VITALS
BODY MASS INDEX: 24.74 KG/M2 | HEART RATE: 60 BPM | RESPIRATION RATE: 20 BRPM | SYSTOLIC BLOOD PRESSURE: 126 MMHG | WEIGHT: 172.4 LBS | DIASTOLIC BLOOD PRESSURE: 68 MMHG

## 2021-01-19 DIAGNOSIS — B35.1 DERMATOPHYTOSIS OF NAIL: ICD-10-CM

## 2021-01-19 DIAGNOSIS — L84 CORNS AND CALLOSITIES: ICD-10-CM

## 2021-01-19 DIAGNOSIS — G60.8 HEREDITARY SENSORY NEUROPATHY: Primary | ICD-10-CM

## 2021-01-19 PROCEDURE — 99999 PR OFFICE/OUTPT VISIT,PROCEDURE ONLY: CPT | Performed by: PODIATRIST

## 2021-01-19 PROCEDURE — 11721 DEBRIDE NAIL 6 OR MORE: CPT | Performed by: PODIATRIST

## 2021-01-19 PROCEDURE — 11055 PARING/CUTG B9 HYPRKER LES 1: CPT | Performed by: PODIATRIST

## 2021-01-19 NOTE — PATIENT INSTRUCTIONS
Antibiotic ointment to left great toe then cover with a bandaid. If toe does not improve please call the office [phone] for an appointment.

## 2021-01-19 NOTE — PROGRESS NOTES
Subjective:  Patient presents to [facility] today for routine foot care. Patient denies any new problems with their feet. Allergies   Allergen Reactions    Sulfa Antibiotics Other (See Comments)     Other reaction(s): Intolerance-unknown  Other reaction(s): Unknown    Sulfamethoxazole      Other reaction(s): Intolerance-unknown    Sulfamethoxazole-Trimethoprim Other (See Comments)     Other reaction(s): Unknown    Trimethoprim      Other reaction(s): Intolerance-unknown    Warfarin      Other reaction(s): Bleeding (non-specific)       Past Medical History:   Diagnosis Date    Atrial fibrillation ([address]     HTN (hypertension)     Hyperlipemia        Prior to Admission medications    Medication Sig Start Date End Date Taking? Authorizing Provider   mupirocin (BACTROBAN) 2 % ointment Apply topically 2 times daily. 6/4/20  Yes Lucille Torrez DPM   Calcium Carb-Cholecalciferol 600-800 MG-UNIT CHEW    Yes Historical Provider, MD   metoprolol succinate (TOPROL XL) 25 MG extended release tablet TAKE 1/2 TABLET BY MOUTH ONE TIME A DAY  1/8/18  Yes Historical Provider, MD   Omega-3 Fatty Acids (FISH OIL PO) Take 2 g by mouth   Yes Historical Provider, MD   atorvastatin (LIPITOR) 40 MG tablet TAKE 1 TABLET BY MOUTH ONE TIME A DAY 7/25/18  Yes Historical Provider, MD   lisinopril (PRINIVIL;ZESTRIL) 40 MG tablet  8/27/18  Yes Historical Provider, MD   apixaban (ELIQUIS) 5 MG TABS tablet Take by mouth 2 times daily   Yes Historical Provider, MD   finasteride (PROSCAR) 5 MG tablet Take 5 mg by mouth daily   Yes Historical Provider, MD       Social History     Tobacco Use    Smoking status: Former Smoker    Smokeless tobacco: Never Used   Substance Use Topics    Alcohol use: No     ROS: All 14 ROS systems reviewed and pertinent positives noted above, all others negative.   Objective:  Vascular: DP and PT pulses palpable 2/4, bilateral.  CFT <3 seconds, bilateral.  Hair growth present to the level of the digits, bilateral.  Edema absent, bilateral.  Varicosities absent, bilateral. Erythema absent, bilateral. Distal Rubor absent bilateral.  Temperature within normal limits bilateral. Hyperpigmentation absent bilateral. No atrophic skin. Neurological: Sensation Impaired to light touch to level of digits, bilateral.  Protective sensation intact  0/10 sites via 5.07/10g [place]-Lori Monofilament, bilateral.  negative Tinel's, bilateral.  negative Valleix sign, bilateral.  Vibratory abnormal  bilateral.  Reflexes Decreased bilateral.  Paresthesias positive. Dysthesias negative. Sharp/dull abnormal bilateral.    Integument:  Nails left 1, 2, 3, 4, 5 and right 1, 2, 3, 4, 5 thickened, dystrophic and crumbly, discolored with subungual debris. Hyperkeratotic tissue is present. Seen sub L hallux . Ecchymotic callous is seen. Small fissure in skin at proximal edge of callous from the irritation. No signs of infx. Assessment:   Diagnosis Orders   1. Hereditary sensory neuropathy     2. Corns and callosities     3. Dermatophytosis of nail           Plan:  All nails as mentioned above debrided in length and thickness. Paring of 1 benign hyperkeratotic lesions (as listed above) took place with #10 blade or tissue nippers. Small fissure oif skin by callous L hallux should use bactroban ointment he has at home qd till healed. Follow up in 2 weeks if not resolved. Patient advised OTC methods for treatment of the mycotic nails. Patient will follow up in 10 weeks.

## 2021-03-18 ENCOUNTER — OFFICE VISIT (OUTPATIENT)
Dept: PODIATRY | Age: 86
End: 2021-03-18
Payer: MEDICARE

## 2021-03-18 VITALS
BODY MASS INDEX: 24.65 KG/M2 | DIASTOLIC BLOOD PRESSURE: 70 MMHG | RESPIRATION RATE: 20 BRPM | TEMPERATURE: 98.4 F | HEART RATE: 72 BPM | SYSTOLIC BLOOD PRESSURE: 122 MMHG | WEIGHT: 171.8 LBS

## 2021-03-18 DIAGNOSIS — L97.522 SKIN ULCER OF TOE OF LEFT FOOT WITH FAT LAYER EXPOSED (HCC): Primary | ICD-10-CM

## 2021-03-18 DIAGNOSIS — L03.032 CELLULITIS OF TOE OF LEFT FOOT: ICD-10-CM

## 2021-03-18 DIAGNOSIS — G60.8 HEREDITARY SENSORY NEUROPATHY: ICD-10-CM

## 2021-03-18 PROCEDURE — A9283 FOOT PRESS OFF LOAD SUPP DEV: HCPCS | Performed by: PODIATRIST

## 2021-03-18 PROCEDURE — 99214 OFFICE O/P EST MOD 30 MIN: CPT | Performed by: PODIATRIST

## 2021-03-18 PROCEDURE — 11042 DBRDMT SUBQ TIS 1ST 20SQCM/<: CPT | Performed by: PODIATRIST

## 2021-03-18 RX ORDER — CEPHALEXIN 500 MG/1
500 CAPSULE ORAL 3 TIMES DAILY
Qty: 30 CAPSULE | Refills: 0 | Status: SHIPPED | OUTPATIENT
Start: 2021-03-18 | End: 2021-03-28

## 2021-03-18 NOTE — PATIENT INSTRUCTIONS
Cleanse wound with normal saline or in the shower if allowed. Pat dry. Cut silver alginate to fit wound, pack lightly. (Silver Alginate will swell as it absorbs drainage.) Cover with dry gauze. Hold with tape, roll gauze or coban. Change at least every three days and whenever drainage comes through the outer dressing. Mail order dressings are from [employer]. You have 2 refills. Call [phone], ext. [phone]. SIGNS OF INFECTION  - Redness, swelling, skin hot  - Wound bed turns black or stringy yellow  - Foul odor  - Increased drainage or pus  - Increased pain  - Fever greater than 100F    CALL YOUR DOCTOR OR SEEK MEDICAL ATTENTION IF SIGNS OF INFECTION.   DO NOT WAIT UNTIL YOUR NEXT APPOINTMENT    Call  with any other questions or concerns- [phone]

## 2021-03-18 NOTE — PROGRESS NOTES
Subjective:    Randi Ramires is a 80 y.o. male who presents for an ulceration of the L big toe. Patient has a wound which is located on the  toes left, great toe. The wound has been present for1 day(s). Patient's daughter just saw this morning and thought that the whole toe looked black. Not sure how long exactly been present. Patient relates pain is Absent . Pain is rated 0 out of 10 and is described as none. Treatments prior to today's visit include: none. Currently denies F/C/N/V. Allergies   Allergen Reactions    Sulfa Antibiotics Other (See Comments)     Other reaction(s): Intolerance-unknown  Other reaction(s): Unknown    Sulfamethoxazole      Other reaction(s): Intolerance-unknown    Sulfamethoxazole-Trimethoprim Other (See Comments)     Other reaction(s): Unknown    Trimethoprim      Other reaction(s): Intolerance-unknown    Warfarin      Other reaction(s): Bleeding (non-specific)       Past Medical History:   Diagnosis Date    At[address] [address]     HTN (hypertension)     Hyperlipemia        Prior to Admission medications    Medication Sig Start Date End Date Taking? Authorizing Provider   cephALEXin (KEFLEX) 500 MG capsule Take 1 capsule by mouth 3 times daily for 10 days 3/18/21 3/28/21 Yes Kimberley Card DPM   mupirocin (BACTROBAN) 2 % ointment Apply topically 2 times daily.  6/4/20  Yes Adriel Munoz DPM   Calcium Carb-Cholecalciferol 600-800 MG-UNIT CHEW    Yes Historical Provider, MD   metoprolol succinate (TOPROL XL) 25 MG extended release tablet TAKE 1/2 TABLET BY MOUTH ONE TIME A DAY  1/8/18  Yes Historical Provider, MD   Omega-3 Fatty Acids (FISH OIL PO) Take 2 g by mouth   Yes Historical Provider, MD   atorvastatin (LIPITOR) 40 MG tablet TAKE 1 TABLET BY MOUTH ONE TIME A DAY 7/25/18  Yes Historical Provider, MD   lisinopril (PRINIVIL;ZESTRIL) 40 MG tablet  8/27/18  Yes Historical Provider, MD   apixaban (ELIQUIS) 5 MG TABS tablet Take by mouth 2 times daily   Yes Historical Provider, MD   finasteride (PROSCAR) 5 MG tablet Take 5 mg by mouth daily   Yes Historical Provider, MD       Past Surgical History:   Procedure Laterality Date    COLONOSCOPY      HERNIA REPAIR Right        History reviewed. No pertinent family history. Social History     Tobacco Use    Smoking status: Former Smoker    Smokeless tobacco: Never Used   Substance Use Topics    Alcohol use: No       ROS: All 14 ROS systems reviewed and pertinent positives noted above, all others negative. Lower Extremity Physical Examination:     Vitals:   Vitals:    03/18/21 1537   BP: 122/70   Pulse: 72   Resp: 20   Temp: 98.4 °F (36.9 °C)     General: AAO x 3 in NAD. Vascular: DP and PT pulses palpable 2/4, bilateral.  CFT <3 seconds, bilateral.  Hair growth absent to the level of the digits, bilateral.  Edema present, bilateral.  Varicosities present, bilateral. Erythema absent, bilateral. Distal Rubor absent bilateral.  Temperature decreased bilateral. Hyperpigmentation present bilateral. Atrophic skin no  Neurological: Sensation intact to light touch to level of digits, bilateral.  Protective sensation intact 10/10 sites via 5.07/10g [place]-Lori Monofilament, bilateral.  negative Tinel's, bilateral.  negative Valleix sign, bilateral.  Vibratory intact bilateral.  Reflexes Decreased bilateral.  Paresthesias negative. Dysthesias negative. Sharp/dull intact bilateral.    Musculoskeletal: Muscle strength 5/5, bilateral.  Pain absent upon palpation bilateral. Normal medial longitudinal arch, bilateral.  Ankle ROM within normal limits,bilateral.  1st MPJ ROM decreased, bilateral.  Dorsally contracted digits absent. No other foot deformities. Integument: Warm, dry, supple, Bilateral.  Open lesion present, Left. Ulcer sub L hallux, positive fibrin into sub q tissue at deepest point, healthy red granular base, proximal edge only,  no probing no undermining no malodor.   Extensive nonviable callus tissue on periphery. Less than 1 cm periulcer edema and erythema. No proximal streaking. .Interdigital maceration absent to web spaces , Bilateral.  Nails b/l thickened, dystrophic and crumbly, discolored with subungual debris. Fissures absent, Bilateral. Hyperkeratotic tissue is present. Wound 03/18/21 # left great toe (Active)   Wound Length (cm) 2.3 cm 03/18/21 1539   Wound Width (cm) 1.2 cm 03/18/21 1539   Wound Depth (cm) 0.2 cm 03/18/21 1539   Wound Surface Area (cm^2) 2.76 cm^2 03/18/21 1539   Wound Volume (cm^3) 0.55 cm^3 03/18/21 1539   Odor Mild 03/18/21 1539   Number of days: 0         Asessment: Patient is a 80 y.o. male with:       Diagnosis Orders   1. Skin ulcer of toe of left foot with fat layer exposed ([address]  WI DEBRIDEMENT, SKIN, SUB-Q TISSUE,=<20 SQ CM   2. Hereditary sensory neuropathy  WI DEBRIDEMENT, SKIN, SUB-Q TISSUE,=<20 SQ CM   cellulitis toe L    Ulcer Classification:  Full thicknessFull thickness grade 2 B. IDSA  mild infection. Plan:   Patient examined and evaluated. Current condition and treatment options discussed in detail. Sharp excisional debridement took place of the ulceration, sub-cutaneous in nature,  tissue nippers were used for debridement. All non viable and necrotic tissue was removed to promote healing. Bleeding was present. See wound assessment note for post debridement measurements. Due to level of pain/deformity/condition, it is in my medical opinion that patient will benefit from and is medically necessary for offloading device at this time. Patient was dispensed a cam walker/surgical shoe with offloading insole to wear 100% of the time WB. Patient was educated on appropriate use of the device and the need to be compliant with offloading therapy. Patient understands that non compliance with the device will be detrimental to the healing of the condition/ulceration. Patient needs the device to help support the foot and reduce pain.   This device is medically necessary due to above listed diagnosis. Dressing: silver algiante  Orders Placed This Encounter   Medications    cephALEXin (KEFLEX) 500 MG capsule     Sig: Take 1 capsule by mouth 3 times daily for 10 days     Dispense:  30 capsule     Refill:  0     Patient high-level medical decision making this visit. Patient is 1 stable chronic condition along with 1 acute complicated condition with a new infected ulceration. No new testing ordered. Patient is moderate risk morbidity prescription drug management and infected ulceration and risk of worsening ulcer deeper infection and othercomplications. Contact clinic with any questions/problems/concerns. Follow-up at [facility] in 2 week(s).

## 2021-04-05 ENCOUNTER — HOSPITAL ENCOUNTER (OUTPATIENT)
Dept: WOUND CARE | Age: 86
Discharge: HOME | End: 2021-04-06
Payer: MEDICARE

## 2021-04-05 VITALS
TEMPERATURE: 96.5 F | BODY MASS INDEX: 25.62 KG/M2 | SYSTOLIC BLOOD PRESSURE: 122 MMHG | HEIGHT: 69 IN | HEART RATE: 70 BPM | RESPIRATION RATE: 16 BRPM | DIASTOLIC BLOOD PRESSURE: 60 MMHG | WEIGHT: 173 LBS

## 2021-04-05 DIAGNOSIS — L97.521 SKIN ULCER OF TOE OF LEFT FOOT, LIMITED TO BREAKDOWN OF SKIN (HCC): Primary | ICD-10-CM

## 2021-04-05 DIAGNOSIS — G62.9 NEUROPATHY: ICD-10-CM

## 2021-04-05 PROCEDURE — 97597 DBRDMT OPN WND 1ST 20 CM/<: CPT | Performed by: PODIATRIST

## 2021-04-05 PROCEDURE — 99213 OFFICE O/P EST LOW 20 MIN: CPT

## 2021-04-05 RX ORDER — GENTAMICIN SULFATE 1 MG/G
OINTMENT TOPICAL ONCE
Status: CANCELLED | OUTPATIENT
Start: 2021-04-05 | End: 2021-04-05

## 2021-04-05 RX ORDER — BACITRACIN, NEOMYCIN, POLYMYXIN B 400; 3.5; 5 [USP'U]/G; MG/G; [USP'U]/G
OINTMENT TOPICAL ONCE
Status: CANCELLED | OUTPATIENT
Start: 2021-04-05 | End: 2021-04-05

## 2021-04-05 RX ORDER — LIDOCAINE 50 MG/G
OINTMENT TOPICAL ONCE
Status: CANCELLED | OUTPATIENT
Start: 2021-04-05 | End: 2021-04-05

## 2021-04-05 RX ORDER — LIDOCAINE HYDROCHLORIDE 20 MG/ML
JELLY TOPICAL ONCE
Status: CANCELLED | OUTPATIENT
Start: 2021-04-05 | End: 2021-04-05

## 2021-04-05 RX ORDER — CLOBETASOL PROPIONATE 0.5 MG/G
OINTMENT TOPICAL ONCE
Status: CANCELLED | OUTPATIENT
Start: 2021-04-05 | End: 2021-04-05

## 2021-04-05 RX ORDER — LIDOCAINE HYDROCHLORIDE 40 MG/ML
SOLUTION TOPICAL ONCE
Status: CANCELLED | OUTPATIENT
Start: 2021-04-05 | End: 2021-04-05

## 2021-04-05 RX ORDER — BETAMETHASONE DIPROPIONATE 0.05 %
OINTMENT (GRAM) TOPICAL ONCE
Status: CANCELLED | OUTPATIENT
Start: 2021-04-05 | End: 2021-04-05

## 2021-04-05 RX ORDER — GINSENG 100 MG
CAPSULE ORAL ONCE
Status: CANCELLED | OUTPATIENT
Start: 2021-04-05 | End: 2021-04-05

## 2021-04-05 RX ORDER — BACITRACIN ZINC AND POLYMYXIN B SULFATE 500; 1000 [USP'U]/G; [USP'U]/G
OINTMENT TOPICAL ONCE
Status: CANCELLED | OUTPATIENT
Start: 2021-04-05 | End: 2021-04-05

## 2021-04-05 RX ORDER — LIDOCAINE 40 MG/G
CREAM TOPICAL ONCE
Status: CANCELLED | OUTPATIENT
Start: 2021-04-05 | End: 2021-04-05

## 2021-04-05 ASSESSMENT — PAIN SCALES - GENERAL: PAINLEVEL_OUTOF10: 0

## 2021-04-05 NOTE — PROGRESS NOTES
Subjective:    Samuel Flores is a 80 y.o. male who presents for an ulceration of the L big toe. Patient's daughter states she goes to change the dressing and the surgical shoe was never on. Patient is always in regular tennis shoes. Patient relates pain is Absent . Pain is rated 0 out of 10 and is described as none. Currently denies F/C/N/V. Allergies   Allergen Reactions    Sulfa Antibiotics Other (See Comments)     Other reaction(s): Intolerance-unknown  Other reaction(s): Unknown    Sulfamethoxazole      Other reaction(s): Intolerance-unknown    Sulfamethoxazole-Trimethoprim Other (See Comments)     Other reaction(s): Unknown    Trimethoprim      Other reaction(s): Intolerance-unknown    Warfarin      Other reaction(s): Bleeding (non-specific)       Past Medical History:   Diagnosis Date    [address] [address]     HTN (hypertension)     Hyperlipemia        Prior to Admission medications    Medication Sig Start Date End Date Taking? Authorizing Provider   mupirocin (BACTROBAN) 2 % ointment Apply topically 2 times daily. 6/4/20  Yes Evaristo Barcenas DPM   Calcium Carb-Cholecalciferol 600-800 MG-UNIT CHEW    Yes Historical Provider, MD   metoprolol succinate (TOPROL XL) 25 MG extended release tablet TAKE 1/2 TABLET BY MOUTH ONE TIME A DAY  1/8/18  Yes Historical Provider, MD   Omega-3 Fatty Acids (FISH OIL PO) Take 2 g by mouth   Yes Historical Provider, MD   atorvastatin (LIPITOR) 40 MG tablet TAKE 1 TABLET BY MOUTH ONE TIME A DAY 7/25/18  Yes Historical Provider, MD   lisinopril (PRINIVIL;ZESTRIL) 40 MG tablet  8/27/18  Yes Historical Provider, MD   apixaban (ELIQUIS) 5 MG TABS tablet Take by mouth 2 times daily   Yes Historical Provider, MD   finasteride (PROSCAR) 5 MG tablet Take 5 mg by mouth daily   Yes Historical Provider, MD       Past Surgical History:   Procedure Laterality Date    COLONOSCOPY      HERNIA REPAIR Right        History reviewed.  No pertinent family history. Social History     Tobacco Use    Smoking status: Former Smoker    Smokeless tobacco: Never Used   Substance Use Topics    Alcohol use: No       ROS: All 14 ROS systems reviewed and pertinent positives noted above, all others negative. Lower Extremity Physical Examination:     Vitals:   Vitals:    04/05/21 1031   BP: 122/60   Pulse: 70   Resp: 16   Temp: 96.5 °F (35.8 °C)     General: AAO x 3 in NAD. Vascular: DP and PT pulses palpable 2/4, bilateral.  CFT <3 seconds, bilateral.  Hair growth absent to the level of the digits, bilateral.  Edema present, bilateral.  Varicosities present, bilateral. Erythema absent, bilateral. Distal Rubor absent bilateral.  Temperature decreased bilateral. Hyperpigmentation present bilateral. Atrophic skin no  Neurological: Sensation intact to light touch to level of digits, bilateral.  Protective sensation intact 10/10 sites via 5.07/10g [place]-Lori Monofilament, bilateral.  negative Tinel's, bilateral.  negative Valleix sign, bilateral.  Vibratory intact bilateral.  Reflexes Decreased bilateral.  Paresthesias negative. Dysthesias negative. Sharp/dull intact bilateral.    Musculoskeletal: Muscle strength 5/5, bilateral.  Pain absent upon palpation bilateral. Normal medial longitudinal arch, bilateral.  Ankle ROM within normal limits,bilateral.  1st MPJ ROM decreased, bilateral.  Dorsally contracted digits absent. No other foot deformities. Integument: Warm, dry, supple, Bilateral.  Open lesion present, Left. Ulcer sub L hallux, positive fibrin , not as deep overall, healthy red granular base, no probing no undermining no malodor. Periulcer edema and erythema resolved. No proximal streaking. .Interdigital maceration absent to web spaces , Bilateral.  Nails b/l thickened, dystrophic and crumbly, discolored with subungual debris. Fissures absent, Bilateral. Hyperkeratotic tissue is present.     Wound 03/18/21 # left great toe (Active)   Dressing Status ana laura  Contact clinic with any questions/problems/concerns. Follow-up at [facility] in 1 week(s).

## 2021-04-15 ENCOUNTER — OFFICE VISIT (OUTPATIENT)
Dept: PODIATRY | Age: 86
End: 2021-04-15
Payer: MEDICARE

## 2021-04-15 VITALS
RESPIRATION RATE: 20 BRPM | HEART RATE: 68 BPM | BODY MASS INDEX: 25.02 KG/M2 | TEMPERATURE: 97.9 F | WEIGHT: 169.4 LBS | DIASTOLIC BLOOD PRESSURE: 60 MMHG | SYSTOLIC BLOOD PRESSURE: 120 MMHG

## 2021-04-15 DIAGNOSIS — G60.8 HEREDITARY SENSORY NEUROPATHY: ICD-10-CM

## 2021-04-15 DIAGNOSIS — L97.521 SKIN ULCER OF TOE OF LEFT FOOT, LIMITED TO BREAKDOWN OF SKIN (HCC): Primary | ICD-10-CM

## 2021-04-15 PROCEDURE — 99214 OFFICE O/P EST MOD 30 MIN: CPT | Performed by: PODIATRIST

## 2021-04-15 PROCEDURE — 99212 OFFICE O/P EST SF 10 MIN: CPT | Performed by: PODIATRIST

## 2021-04-15 NOTE — PROGRESS NOTES
Subjective:    Evelyne Dumont is a 80 y.o. male who presents for an ulceration of the L big toe. Patient's daughter states the dressing is always off and she was back the next day and is never wearing the offloading device. Patient relates pain is Absent . Pain is rated 0 out of 10 and is described as none. Currently denies F/C/N/V. Allergies   Allergen Reactions    Sulfa Antibiotics Other (See Comments)     Other reaction(s): Intolerance-unknown  Other reaction(s): Unknown    Sulfamethoxazole      Other reaction(s): Intolerance-unknown    Sulfamethoxazole-Trimethoprim Other (See Comments)     Other reaction(s): Unknown    Trimethoprim      Other reaction(s): Intolerance-unknown    Warfarin      Other reaction(s): Bleeding (non-specific)       Past Medical History:   Diagnosis Date    At[address] fibrillation ([facility] [address]     HTN (hypertension)     Hyperlipemia        Prior to Admission medications    Medication Sig Start Date End Date Taking? Authorizing Provider   mupirocin (BACTROBAN) 2 % ointment Apply topically 2 times daily. 6/4/20  Yes Ni Calhoun DPM   Calcium Carb-Cholecalciferol 600-800 MG-UNIT CHEW    Yes Historical Provider, MD   metoprolol succinate (TOPROL XL) 25 MG extended release tablet TAKE 1/2 TABLET BY MOUTH ONE TIME A DAY  1/8/18  Yes Historical Provider, MD   Omega-3 Fatty Acids (FISH OIL PO) Take 2 g by mouth   Yes Historical Provider, MD   atorvastatin (LIPITOR) 40 MG tablet TAKE 1 TABLET BY MOUTH ONE TIME A DAY 7/25/18  Yes Historical Provider, MD   lisinopril (PRINIVIL;ZESTRIL) 40 MG tablet  8/27/18  Yes Historical Provider, MD   apixaban (ELIQUIS) 5 MG TABS tablet Take by mouth 2 times daily   Yes Historical Provider, MD   finasteride (PROSCAR) 5 MG tablet Take 5 mg by mouth daily   Yes Historical Provider, MD       Past Surgical History:   Procedure Laterality Date    COLONOSCOPY      HERNIA REPAIR Right        History reviewed. No pertinent family history.     Social History thicknessFull thickness grade 2 B with 2C look resolved. IDSA  no infection. Plan:   Patient examined and evaluated. Current condition and treatment options discussed in detail. Patient history for medical decision making this visit. No risk morbidity current treatment. No debridement needed of the ulcer today. The ulcer is completley resolved at this time. Pt was educated about means of prevention and risk factor modification. Pt should return to the clinic PRN if any further ulceration should occur. Contact clinic with any questions/problems/concerns.  Follow-up at [facility] in [place]

## 2021-05-03 ENCOUNTER — OFFICE VISIT (OUTPATIENT)
Dept: PODIATRY | Age: 86
End: 2021-05-03
Payer: MEDICARE

## 2021-05-03 VITALS
HEART RATE: 68 BPM | HEIGHT: 69 IN | SYSTOLIC BLOOD PRESSURE: 118 MMHG | DIASTOLIC BLOOD PRESSURE: 72 MMHG | BODY MASS INDEX: 24.73 KG/M2 | WEIGHT: 167 LBS

## 2021-05-03 DIAGNOSIS — B35.1 DERMATOPHYTOSIS OF NAIL: ICD-10-CM

## 2021-05-03 DIAGNOSIS — G60.8 HEREDITARY SENSORY NEUROPATHY: Primary | ICD-10-CM

## 2021-05-03 DIAGNOSIS — L84 CORNS AND CALLOSITIES: ICD-10-CM

## 2021-05-03 PROCEDURE — 11721 DEBRIDE NAIL 6 OR MORE: CPT | Performed by: PODIATRIST

## 2021-05-03 PROCEDURE — 11056 PARNG/CUTG B9 HYPRKR LES 2-4: CPT | Performed by: PODIATRIST

## 2021-05-03 PROCEDURE — 99999 PR OFFICE/OUTPT VISIT,PROCEDURE ONLY: CPT | Performed by: PODIATRIST

## 2021-05-03 NOTE — PROGRESS NOTES
Subjective:  Patient presents to [facility] today for routine foot care. Patient denies any new problems with their feet. Allergies   Allergen Reactions    Sulfa Antibiotics Other (See Comments)     Other reaction(s): Intolerance-unknown  Other reaction(s): Unknown    Sulfamethoxazole      Other reaction(s): Intolerance-unknown    Sulfamethoxazole-Trimethoprim Other (See Comments)     Other reaction(s): Unknown    Trimethoprim      Other reaction(s): Intolerance-unknown    Warfarin      Other reaction(s): Bleeding (non-specific)       Past Medical History:   Diagnosis Date    Atrial fibrillation ([address]     HTN (hypertension)     Hyperlipemia        Prior to Admission medications    Medication Sig Start Date End Date Taking? Authorizing Provider   mupirocin (BACTROBAN) 2 % ointment Apply topically 2 times daily. 6/4/20  Yes Jayden Cutler DPM   Calcium Carb-Cholecalciferol 600-800 MG-UNIT CHEW    Yes Historical Provider, MD   metoprolol succinate (TOPROL XL) 25 MG extended release tablet TAKE 1/2 TABLET BY MOUTH ONE TIME A DAY  1/8/18  Yes Historical Provider, MD   Omega-3 Fatty Acids (FISH OIL PO) Take 2 g by mouth   Yes Historical Provider, MD   atorvastatin (LIPITOR) 40 MG tablet TAKE 1 TABLET BY MOUTH ONE TIME A DAY 7/25/18  Yes Historical Provider, MD   lisinopril (PRINIVIL;ZESTRIL) 40 MG tablet  8/27/18  Yes Historical Provider, MD   apixaban (ELIQUIS) 5 MG TABS tablet Take by mouth 2 times daily   Yes Historical Provider, MD   finasteride (PROSCAR) 5 MG tablet Take 5 mg by mouth daily   Yes Historical Provider, MD       Social History     Tobacco Use    Smoking status: Former Smoker    Smokeless tobacco: Never Used   Substance Use Topics    Alcohol use: No     Review of Systems: All 12 systems reviewed and pertinent positives noted above.   Objective:  Vascular: DP and PT pulses palpable 2/4, bilateral.  CFT <3 seconds, bilateral.  Hair growth present to the level of the digits, bilateral.  Edema absent, bilateral.  Varicosities absent, bilateral. Erythema absent, bilateral. Distal Rubor absent bilateral.  Temperature within normal limits bilateral. Hyperpigmentation absent bilateral. No atrophic skin. Neurological: Sensation Impaired to light touch to level of digits, bilateral.  Protective sensation intact  0/10 sites via 5.07/10g [place]-Lori Monofilament, bilateral.  negative Tinel's, bilateral.  negative Valleix sign, bilateral.  Vibratory abnormal  bilateral.  Reflexes Decreased bilateral.  Paresthesias positive. Dysthesias negative. Sharp/dull abnormal bilateral.    Integument:  Nails left 1, 2, 3, 4, 5 and right 1, 2, 3, 4, 5 thickened, dystrophic and crumbly, discolored with subungual debris. Hyperkeratotic tissue is present. Seen sub b/l hallux    Assessment:   Diagnosis Orders   1. Hereditary sensory neuropathy     2. Corns and callosities     3. Dermatophytosis of nail           Plan:  All nails as mentioned above debrided in length and thickness. Paring of 2 benign hyperkeratotic lesions (as listed above) took place with #10 blade or tissue nippers. Patient advised OTC methods for treatment of the mycotic nails. Patient will follow up in 10 weeks.

## 2021-07-13 ENCOUNTER — OFFICE VISIT (OUTPATIENT)
Dept: PODIATRY | Age: 86
End: 2021-07-13
Payer: MEDICARE

## 2021-07-13 VITALS
HEIGHT: 69 IN | BODY MASS INDEX: 24.59 KG/M2 | WEIGHT: 166 LBS | SYSTOLIC BLOOD PRESSURE: 100 MMHG | DIASTOLIC BLOOD PRESSURE: 60 MMHG | HEART RATE: 62 BPM

## 2021-07-13 DIAGNOSIS — L84 CORNS AND CALLOSITIES: ICD-10-CM

## 2021-07-13 DIAGNOSIS — B35.1 DERMATOPHYTOSIS OF NAIL: ICD-10-CM

## 2021-07-13 DIAGNOSIS — G60.8 HEREDITARY SENSORY NEUROPATHY: Primary | ICD-10-CM

## 2021-07-13 PROCEDURE — 11721 DEBRIDE NAIL 6 OR MORE: CPT | Performed by: PODIATRIST

## 2021-07-13 PROCEDURE — 11055 PARING/CUTG B9 HYPRKER LES 1: CPT | Performed by: PODIATRIST

## 2021-07-13 PROCEDURE — 99999 PR OFFICE/OUTPT VISIT,PROCEDURE ONLY: CPT | Performed by: PODIATRIST

## 2021-07-13 NOTE — PROGRESS NOTES
Foot Care Worksheet  PCP: Teressa Landry MD  Last visit: 06/01/2021        Nail description:  Thick , Yellow , Crumbly , Marked limitation of ambulation     Pain resulting from thickened and dystrophy of nail plate No    Nails involved  Right   1, 2, 3, 4, 5  (T5-T9)  Left     1, 2, 3, 4, 5  (TA-T4)    Q7 1 Class A Finding - Non traumatic amputation of foot No    Q8 2 Class B Findings - Absent DP pulse No, Absent PT pulse No, Advanced tropic changes (3 required) Yes    Decrease hair growth Yes, Nail changes/thickening Yes, Pigmented changes/discoloration Yes, Skin texture (thin, shiny) No, Skin color (rubor/redness) No    Q9 1 Class B and 2 Class C Findings  Claudication No, Temperature change Yes, Paresthesia No, Burning No, Edema Yes

## 2021-07-13 NOTE — PROGRESS NOTES
Subjective:  Patient presents to [facility] today for routine foot care. Patient denies any new problems with their feet. Allergies   Allergen Reactions    Sulfa Antibiotics Other (See Comments)     Other reaction(s): Intolerance-unknown  Other reaction(s): Unknown    Sulfamethoxazole      Other reaction(s): Intolerance-unknown    Sulfamethoxazole-Trimethoprim Other (See Comments)     Other reaction(s): Unknown    Trimethoprim      Other reaction(s): Intolerance-unknown    Warfarin      Other reaction(s): Bleeding (non-specific)       Past Medical History:   Diagnosis Date    Atrial fibrillation ([address]     HTN (hypertension)     Hyperlipemia        Prior to Admission medications    Medication Sig Start Date End Date Taking? Authorizing Provider   mupirocin (BACTROBAN) 2 % ointment Apply topically 2 times daily. 6/4/20  Yes Gayle Ohara DPM   Calcium Carb-Cholecalciferol 600-800 MG-UNIT CHEW    Yes Historical Provider, MD   metoprolol succinate (TOPROL XL) 25 MG extended release tablet TAKE 1/2 TABLET BY MOUTH ONE TIME A DAY  1/8/18  Yes Historical Provider, MD   Omega-3 Fatty Acids (FISH OIL PO) Take 2 g by mouth   Yes Historical Provider, MD   atorvastatin (LIPITOR) 40 MG tablet TAKE 1 TABLET BY MOUTH ONE TIME A DAY 7/25/18  Yes Historical Provider, MD   lisinopril (PRINIVIL;ZESTRIL) 40 MG tablet  8/27/18  Yes Historical Provider, MD   apixaban (ELIQUIS) 5 MG TABS tablet Take by mouth 2 times daily   Yes Historical Provider, MD   finasteride (PROSCAR) 5 MG tablet Take 5 mg by mouth daily   Yes Historical Provider, MD       Social History     Tobacco Use    Smoking status: Former Smoker    Smokeless tobacco: Never Used   Substance Use Topics    Alcohol use: No     Review of Systems: All 12 systems reviewed and pertinent positives noted above.   Objective:  Vascular: DP and PT pulses palpable 2/4, bilateral.  CFT <3 seconds, bilateral.  Hair growth present to the level of the digits, bilateral.  Edema absent, bilateral.  Varicosities absent, bilateral. Erythema absent, bilateral. Distal Rubor absent bilateral.  Temperature within normal limits bilateral. Hyperpigmentation absent bilateral. No atrophic skin. Neurological: Sensation Impaired to light touch to level of digits, bilateral.  Protective sensation intact  0/10 sites via 5.07/10g [place]-Lori Monofilament, bilateral.  negative Tinel's, bilateral.  negative Valleix sign, bilateral.  Vibratory abnormal  bilateral.  Reflexes Decreased bilateral.  Paresthesias positive. Dysthesias negative. Sharp/dull abnormal bilateral.    Integument:  Nails left 1, 2, 3, 4, 5 and right 1, 2, 3, 4, 5 thickened, dystrophic and crumbly, discolored with subungual debris. Hyperkeratotic tissue is present. Seen sub L hallux    Assessment:   Diagnosis Orders   1. Hereditary sensory neuropathy     2. Dermatophytosis of nail     3. Corns and callosities           Plan:  All nails as mentioned above debrided in length and thickness. Paring of 1 benign hyperkeratotic lesions (as listed above) took place with #10 blade or tissue nippers. Patient advised OTC methods for treatment of the mycotic nails. Patient will follow up in 10 weeks.

## 2021-10-13 ENCOUNTER — OFFICE VISIT (OUTPATIENT)
Dept: PODIATRY | Age: 86
End: 2021-10-13
Payer: MEDICARE

## 2021-10-13 VITALS
DIASTOLIC BLOOD PRESSURE: 78 MMHG | HEIGHT: 69 IN | BODY MASS INDEX: 24.59 KG/M2 | HEART RATE: 72 BPM | WEIGHT: 166 LBS | SYSTOLIC BLOOD PRESSURE: 130 MMHG

## 2021-10-13 DIAGNOSIS — L84 CORNS AND CALLOSITIES: ICD-10-CM

## 2021-10-13 DIAGNOSIS — B35.1 DERMATOPHYTOSIS OF NAIL: ICD-10-CM

## 2021-10-13 DIAGNOSIS — G60.8 HEREDITARY SENSORY NEUROPATHY: Primary | ICD-10-CM

## 2021-10-13 PROCEDURE — 11721 DEBRIDE NAIL 6 OR MORE: CPT | Performed by: PODIATRIST

## 2021-10-13 PROCEDURE — 11055 PARING/CUTG B9 HYPRKER LES 1: CPT | Performed by: PODIATRIST

## 2021-10-13 NOTE — PROGRESS NOTES
Foot Care Worksheet  PCP: Haily Pina MD  Last visit: 7/21/21            Nail description:  Thick , Yellow , Crumbly , Marked limitation of ambulation     Pain resulting from thickened and dystrophy of nail plate No    Nails involved  Right   1, 2, 3, 4, 5  (T5-T9)  Left     1, 2, 3, 4, 5  (TA-T4)    Q7 1 Class A Finding - Non traumatic amputation of foot No    Q8 2 Class B Findings - Absent DP pulse No, Absent PT pulse No, Advanced tropic changes (3 required) Yes    Decrease hair growth Yes, Nail changes/thickening Yes, Pigmented changes/discoloration Yes, Skin texture (thin, shiny) No, Skin color (rubor/redness) No    Q9 1 Class B and 2 Class C Findings  Claudication No, Temperature change Yes, Paresthesia No, Burning No, Edema Yes

## 2021-10-13 NOTE — PROGRESS NOTES
Subjective:  Patient presents to [facility] today for routine foot care. Patient denies any new problems with their feet. Allergies   Allergen Reactions    Sulfa Antibiotics Other (See Comments)     Other reaction(s): Intolerance-unknown  Other reaction(s): Unknown    Sulfamethoxazole      Other reaction(s): Intolerance-unknown    Sulfamethoxazole-Trimethoprim Other (See Comments)     Other reaction(s): Unknown    Trimethoprim      Other reaction(s): Intolerance-unknown    Warfarin      Other reaction(s): Bleeding (non-specific)       Past Medical History:   Diagnosis Date    Atrial fibrillation ([address]     HTN (hypertension)     Hyperlipemia        Prior to Admission medications    Medication Sig Start Date End Date Taking? Authorizing Provider   mupirocin (BACTROBAN) 2 % ointment Apply topically 2 times daily. 6/4/20  Yes Rodrigue Howard DPM   Calcium Carb-Cholecalciferol 600-800 MG-UNIT CHEW    Yes Historical Provider, MD   metoprolol succinate (TOPROL XL) 25 MG extended release tablet TAKE 1/2 TABLET BY MOUTH ONE TIME A DAY  1/8/18  Yes Historical Provider, MD   Omega-3 Fatty Acids (FISH OIL PO) Take 2 g by mouth   Yes Historical Provider, MD   atorvastatin (LIPITOR) 40 MG tablet TAKE 1 TABLET BY MOUTH ONE TIME A DAY 7/25/18  Yes Historical Provider, MD   lisinopril (PRINIVIL;ZESTRIL) 40 MG tablet  8/27/18  Yes Historical Provider, MD   apixaban (ELIQUIS) 5 MG TABS tablet Take by mouth 2 times daily   Yes Historical Provider, MD   finasteride (PROSCAR) 5 MG tablet Take 5 mg by mouth daily   Yes Historical Provider, MD       Social History     Tobacco Use    Smoking status: Former Smoker    Smokeless tobacco: Never Used   Substance Use Topics    Alcohol use: No     Review of Systems: All 12 systems reviewed and pertinent positives noted above.   Objective:  Vascular: DP and PT pulses palpable 2/4, bilateral.  CFT <3 seconds, bilateral.  Hair growth present to the level of the digits, bilateral.  Edema absent, bilateral.  Varicosities absent, bilateral. Erythema absent, bilateral. Distal Rubor absent bilateral.  Temperature within normal limits bilateral. Hyperpigmentation absent bilateral. No atrophic skin. Neurological: Sensation Impaired to light touch to level of digits, bilateral.  Protective sensation intact  0/10 sites via 5.07/10g [place]-Lori Monofilament, bilateral.  negative Tinel's, bilateral.  negative Valleix sign, bilateral.  Vibratory abnormal  bilateral.  Reflexes Decreased bilateral.  Paresthesias positive. Dysthesias negative. Sharp/dull abnormal bilateral.    Integument:  Nails left 1, 2, 3, 4, 5 and right 1, 2, 3, 4, 5 thickened, dystrophic and crumbly, discolored with subungual debris. Hyperkeratotic tissue is present. Seen sub L hallux    Assessment:   Diagnosis Orders   1. Hereditary sensory neuropathy     2. Dermatophytosis of nail     3. Corns and callosities             Plan:  All nails as mentioned above debrided in length and thickness. Paring of 1 benign hyperkeratotic lesions (as listed above) took place with #10 blade or tissue nippers. Patient advised OTC methods for treatment of the mycotic nails. Patient will follow up in 10 weeks.

## 2022-01-12 ENCOUNTER — OFFICE VISIT (OUTPATIENT)
Dept: PODIATRY | Age: 87
End: 2022-01-12
Payer: MEDICARE

## 2022-01-12 VITALS
BODY MASS INDEX: 25.33 KG/M2 | HEIGHT: 69 IN | HEART RATE: 68 BPM | SYSTOLIC BLOOD PRESSURE: 118 MMHG | DIASTOLIC BLOOD PRESSURE: 80 MMHG | WEIGHT: 171 LBS

## 2022-01-12 DIAGNOSIS — L84 CORNS AND CALLOSITIES: ICD-10-CM

## 2022-01-12 DIAGNOSIS — B35.1 DERMATOPHYTOSIS OF NAIL: ICD-10-CM

## 2022-01-12 DIAGNOSIS — G60.8 HEREDITARY SENSORY NEUROPATHY: Primary | ICD-10-CM

## 2022-01-12 PROCEDURE — 11055 PARING/CUTG B9 HYPRKER LES 1: CPT | Performed by: PODIATRIST

## 2022-01-12 PROCEDURE — 11721 DEBRIDE NAIL 6 OR MORE: CPT | Performed by: PODIATRIST

## 2022-01-12 PROCEDURE — 99999 PR OFFICE/OUTPT VISIT,PROCEDURE ONLY: CPT | Performed by: PODIATRIST

## 2022-01-12 NOTE — PROGRESS NOTES
Foot Care Worksheet  PCP: Edmond Bills MD  Last visit: 7/21/21            Nail description:  Thick , Yellow , Crumbly , Marked limitation of ambulation     Pain resulting from thickened and dystrophy of nail plate No    Nails involved  Right   1, 2, 3, 4, 5  (T5-T9)  Left     1, 2, 3, 4, 5  (TA-T4)    Q7 1 Class A Finding - Non traumatic amputation of foot No    Q8 2 Class B Findings - Absent DP pulse No, Absent PT pulse No, Advanced tropic changes (3 required) Yes    Decrease hair growth Yes, Nail changes/thickening Yes, Pigmented changes/discoloration Yes, Skin texture (thin, shiny) No, Skin color (rubor/redness) No    Q9 1 Class B and 2 Class C Findings  Claudication No, Temperature change Yes, Paresthesia No, Burning No, Edema Yes

## 2022-01-12 NOTE — PROGRESS NOTES
Subjective:  Patient presents to [facility] today for routine foot care. Patient denies any new problems with their feet. Allergies   Allergen Reactions    Sulfa Antibiotics Other (See Comments)     Other reaction(s): Intolerance-unknown  Other reaction(s): Unknown    Sulfamethoxazole      Other reaction(s): Intolerance-unknown    Sulfamethoxazole-Trimethoprim Other (See Comments)     Other reaction(s): Unknown    Trimethoprim      Other reaction(s): Intolerance-unknown    Warfarin      Other reaction(s): Bleeding (non-specific)       Past Medical History:   Diagnosis Date    Atrial fibrillation ([address]     HTN (hypertension)     Hyperlipemia        Prior to Admission medications    Medication Sig Start Date End Date Taking? Authorizing Provider   mupirocin (BACTROBAN) 2 % ointment Apply topically 2 times daily. 6/4/20   Alexandra Carey DPM   Calcium Carb-Cholecalciferol 600-800 MG-UNIT CHEW     Historical Provider, MD   metoprolol succinate (TOPROL XL) 25 MG extended release tablet TAKE 1/2 TABLET BY MOUTH ONE TIME A DAY  1/8/18   Historical Provider, MD   Omega-3 Fatty Acids (FISH OIL PO) Take 2 g by mouth    Historical Provider, MD   atorvastatin (LIPITOR) 40 MG tablet TAKE 1 TABLET BY MOUTH ONE TIME A DAY 7/25/18   Historical Provider, MD   lisinopril (PRINIVIL;ZESTRIL) 40 MG tablet  8/27/18   Historical Provider, MD   apixaban (ELIQUIS) 5 MG TABS tablet Take by mouth 2 times daily    Historical Provider, MD   finasteride (PROSCAR) 5 MG tablet Take 5 mg by mouth daily    Historical Provider, MD       Social History     Tobacco Use    Smoking status: Former Smoker    Smokeless tobacco: Never Used   Substance Use Topics    Alcohol use: No     Review of Systems: All 12 systems reviewed and pertinent positives noted above.   Objective:  Vascular: DP and PT pulses palpable 2/4, bilateral.  CFT <3 seconds, bilateral.  Hair growth present to the level of the digits, bilateral.  Edema absent, bilateral.  Varicosities absent, bilateral. Erythema absent, bilateral. Distal Rubor absent bilateral.  Temperature within normal limits bilateral. Hyperpigmentation absent bilateral. No atrophic skin. Neurological: Sensation Impaired to light touch to level of digits, bilateral.  Protective sensation intact  0/10 sites via 5.07/10g [place]-Lori Monofilament, bilateral.  negative Tinel's, bilateral.  negative Valleix sign, bilateral.  Vibratory abnormal  bilateral.  Reflexes Decreased bilateral.  Paresthesias positive. Dysthesias negative. Sharp/dull abnormal bilateral.    Integument:  Nails left 1, 2, 3, 4, 5 and right 1, 2, 3, 4, 5 thickened, dystrophic and crumbly, discolored with subungual debris. Hyperkeratotic tissue is present. Seen sub L hallux    Assessment:   Diagnosis Orders   1. Hereditary sensory neuropathy     2. Dermatophytosis of nail     3. Corns and callosities             Plan:  All nails as mentioned above debrided in length and thickness. Paring of 1 benign hyperkeratotic lesions (as listed above) took place with #10 blade or tissue nippers. Patient advised OTC methods for treatment of the mycotic nails. Patient will follow up in 10 weeks.

## 2022-04-27 ENCOUNTER — OFFICE VISIT (OUTPATIENT)
Dept: PODIATRY | Age: 87
End: 2022-04-27
Payer: MEDICARE

## 2022-04-27 VITALS
BODY MASS INDEX: 24.99 KG/M2 | HEART RATE: 72 BPM | SYSTOLIC BLOOD PRESSURE: 120 MMHG | WEIGHT: 169.2 LBS | RESPIRATION RATE: 20 BRPM | DIASTOLIC BLOOD PRESSURE: 68 MMHG

## 2022-04-27 DIAGNOSIS — L84 CORNS AND CALLOSITIES: ICD-10-CM

## 2022-04-27 DIAGNOSIS — B35.1 DERMATOPHYTOSIS OF NAIL: ICD-10-CM

## 2022-04-27 DIAGNOSIS — G60.8 HEREDITARY SENSORY NEUROPATHY: Primary | ICD-10-CM

## 2022-04-27 PROCEDURE — 99999 PR OFFICE/OUTPT VISIT,PROCEDURE ONLY: CPT | Performed by: PODIATRIST

## 2022-04-27 PROCEDURE — 11721 DEBRIDE NAIL 6 OR MORE: CPT | Performed by: PODIATRIST

## 2022-04-27 PROCEDURE — 11055 PARING/CUTG B9 HYPRKER LES 1: CPT | Performed by: PODIATRIST

## 2022-04-27 NOTE — PROGRESS NOTES
Subjective:  Patient presents to [facility] today for routine foot care. Patient denies any new problems with their feet. Allergies   Allergen Reactions    Sulfa Antibiotics Other (See Comments)     Other reaction(s): Intolerance-unknown  Other reaction(s): Unknown  Other reaction(s): edema    Sulfamethoxazole      Other reaction(s): Intolerance-unknown    Sulfamethoxazole-Trimethoprim Other (See Comments)     Other reaction(s): Unknown    Trimethoprim      Other reaction(s): Intolerance-unknown  unknown    Warfarin      Other reaction(s): Bleeding (non-specific)  Bleeding (non-specific)       Past Medical History:   Diagnosis Date    Atrial fibrillation ([address]     HTN (hypertension)     Hyperlipemia        Prior to Admission medications    Medication Sig Start Date End Date Taking? Authorizing Provider   mupirocin (BACTROBAN) 2 % ointment Apply topically 2 times daily. 6/4/20  Yes Jigar Daly DPM   Calcium Carb-Cholecalciferol 600-800 MG-UNIT CHEW    Yes Historical Provider, MD   metoprolol succinate (TOPROL XL) 25 MG extended release tablet TAKE 1/2 TABLET BY MOUTH ONE TIME A DAY  1/8/18  Yes Historical Provider, MD   Omega-3 Fatty Acids (FISH OIL PO) Take 2 g by mouth   Yes Historical Provider, MD   atorvastatin (LIPITOR) 40 MG tablet TAKE 1 TABLET BY MOUTH ONE TIME A DAY 7/25/18  Yes Historical Provider, MD   lisinopril (PRINIVIL;ZESTRIL) 40 MG tablet  8/27/18  Yes Historical Provider, MD   apixaban (ELIQUIS) 5 MG TABS tablet Take by mouth 2 times daily   Yes Historical Provider, MD   finasteride (PROSCAR) 5 MG tablet Take 5 mg by mouth daily   Yes Historical Provider, MD       Social History     Tobacco Use    Smoking status: Former Smoker    Smokeless tobacco: Never Used   Substance Use Topics    Alcohol use: No     Review of Systems: All 12 systems reviewed and pertinent positives noted above.   Objective:  Vascular: DP and PT pulses palpable 2/4, bilateral.  CFT <3 seconds, bilateral.  Hair growth present to the level of the digits, bilateral.  Edema absent, bilateral.  Varicosities absent, bilateral. Erythema absent, bilateral. Distal Rubor absent bilateral.  Temperature within normal limits bilateral. Hyperpigmentation absent bilateral. No atrophic skin. Neurological: Sensation Impaired to light touch to level of digits, bilateral.  Protective sensation intact  0/10 sites via 5.07/10g [place]-Lori Monofilament, bilateral.  negative Tinel's, bilateral.  negative Valleix sign, bilateral.  Vibratory abnormal  bilateral.  Reflexes Decreased bilateral.  Paresthesias positive. Dysthesias negative. Sharp/dull abnormal bilateral.    Integument:  Nails left 1, 2, 3, 4, 5 and right 1, 2, 3, 4, 5 thickened, dystrophic and crumbly, discolored with subungual debris. Hyperkeratotic tissue is present. Seen sub L hallux    Assessment:   Diagnosis Orders   1. Hereditary sensory neuropathy     2. Dermatophytosis of nail     3. Corns and callosities             Plan:  All nails as mentioned above debrided in length and thickness. Paring of 1 benign hyperkeratotic lesions (as listed above) took place with #10 blade or tissue nippers. Patient advised OTC methods for treatment of the mycotic nails. Patient will follow up in 10 weeks.

## 2022-04-27 NOTE — PROGRESS NOTES
Foot Care Worksheet  PCP: Ramón Zabala MD  Last visit:             Nail description:  Thick , Yellow , Crumbly , Marked limitation of ambulation     Pain resulting from thickened and dystrophy of nail plate No    Nails involved  Right   1, 2, 3, 4, 5  (T5-T9)  Left     1, 2, 3, 4, 5  (TA-T4)    Q7 1 Class A Finding - Non traumatic amputation of foot No    Q8 2 Class B Findings - Absent DP pulse No, Absent PT pulse No, Advanced tropic changes (3 required) Yes    Decrease hair growth Yes, Nail changes/thickening Yes, Pigmented changes/discoloration Yes, Skin texture (thin, shiny) No, Skin color (rubor/redness) No    Q9 1 Class B and 2 Class C Findings  Claudication No, Temperature change Yes, Paresthesia No, Burning No, Edema Yes

## 2022-08-24 ENCOUNTER — OFFICE VISIT (OUTPATIENT)
Dept: PODIATRY | Age: 87
End: 2022-08-24
Payer: MEDICARE

## 2022-08-24 VITALS
RESPIRATION RATE: 20 BRPM | BODY MASS INDEX: 21.56 KG/M2 | HEART RATE: 64 BPM | DIASTOLIC BLOOD PRESSURE: 62 MMHG | WEIGHT: 146 LBS | SYSTOLIC BLOOD PRESSURE: 114 MMHG

## 2022-08-24 DIAGNOSIS — B35.1 DERMATOPHYTOSIS OF NAIL: ICD-10-CM

## 2022-08-24 DIAGNOSIS — G60.8 HEREDITARY SENSORY NEUROPATHY: Primary | ICD-10-CM

## 2022-08-24 DIAGNOSIS — L84 CORNS AND CALLOSITIES: ICD-10-CM

## 2022-08-24 PROCEDURE — 99999 PR OFFICE/OUTPT VISIT,PROCEDURE ONLY: CPT | Performed by: PODIATRIST

## 2022-08-24 PROCEDURE — 11055 PARING/CUTG B9 HYPRKER LES 1: CPT | Performed by: PODIATRIST

## 2022-08-24 PROCEDURE — 11721 DEBRIDE NAIL 6 OR MORE: CPT | Performed by: PODIATRIST

## 2022-08-24 NOTE — PROGRESS NOTES
Foot Care Worksheet  PCP: Lamont Castanon MD  Last visit: 06 / 09 / 2022        Nail description:  Thick , Yellow , Crumbly , Marked limitation of ambulation     Pain resulting from thickened and dystrophy of nail plate No    Nails involved  Right   1, 2, 3, 4, 5  (T5-T9)  Left     1, 2, 3, 4, 5  (TA-T4)    Q7 1 Class A Finding - Non traumatic amputation of foot No    Q8 2 Class B Findings - Absent DP pulse No, Absent PT pulse No, Advanced tropic changes (3 required) Yes    Decrease hair growth Yes, Nail changes/thickening Yes, Pigmented changes/discoloration Yes, Skin texture (thin, shiny) No, Skin color (rubor/redness) No    Q9 1 Class B and 2 Class C Findings  Claudication No, Temperature change Yes, Paresthesia No, Burning No, Edema Yes

## 2022-08-24 NOTE — PROGRESS NOTES
Subjective:  Patient presents to [facility] today for routine foot care. Patient denies any new problems with their feet. Allergies   Allergen Reactions    Sulfa Antibiotics Other (See Comments)     Other reaction(s): Intolerance-unknown  Other reaction(s): Unknown  Other reaction(s): edema    Sulfamethoxazole      Other reaction(s): Intolerance-unknown    Sulfamethoxazole-Trimethoprim Other (See Comments)     Other reaction(s): Unknown    Trimethoprim      Other reaction(s): Intolerance-unknown  unknown    Warfarin      Other reaction(s): Bleeding (non-specific)  Bleeding (non-specific)       Past Medical History:   Diagnosis Date    Atrial fibrillation ([facility])     HTN (hypertension)     Hyperlipemia        Prior to Admission medications    Medication Sig Start Date End Date Taking? Authorizing Provider   mupirocin (BACTROBAN) 2 % ointment Apply topically 2 times daily. 6/4/20  Yes Jf Ortiz DPM   Calcium Carb-Cholecalciferol 600-800 MG-UNIT CHEW    Yes Historical Provider, MD   metoprolol succinate (TOPROL XL) 25 MG extended release tablet TAKE 1/2 TABLET BY MOUTH ONE TIME A DAY  1/8/18  Yes Historical Provider, MD   Omega-3 Fatty Acids (FISH OIL PO) Take 2 g by mouth   Yes Historical Provider, MD   atorvastatin (LIPITOR) 40 MG tablet TAKE 1 TABLET BY MOUTH ONE TIME A DAY 7/25/18  Yes Historical Provider, MD   lisinopril (PRINIVIL;ZESTRIL) 40 MG tablet  8/27/18  Yes Historical Provider, MD   apixaban (ELIQUIS) 5 MG TABS tablet Take by mouth 2 times daily   Yes Historical Provider, MD   finasteride (PROSCAR) 5 MG tablet Take 5 mg by mouth daily   Yes Historical Provider, MD       Social History     Tobacco Use    Smoking status: Former    Smokeless tobacco: Never   Substance Use Topics    Alcohol use: No     Review of Systems: All 12 systems reviewed and pertinent positives noted above.   Objective:  Vascular: DP and PT pulses palpable 2/4, bilateral.  CFT <3 seconds, bilateral.  Hair growth present to the level of the digits, bilateral.  Edema absent, bilateral.  Varicosities absent, bilateral. Erythema absent, bilateral. Distal Rubor absent bilateral.  Temperature within normal limits bilateral. Hyperpigmentation absent bilateral. No atrophic skin. Neurological: Sensation Impaired to light touch to level of digits, bilateral.  Protective sensation intact  0/10 sites via 5.07/10g [place]-Lori Monofilament, bilateral.  negative Tinel's, bilateral.  negative Valleix sign, bilateral.  Vibratory abnormal  bilateral.  Reflexes Decreased bilateral.  Paresthesias positive. Dysthesias negative. Sharp/dull abnormal bilateral.    Integument:  Nails left 1, 2, 3, 4, 5 and right 1, 2, 3, 4, 5 thickened, dystrophic and crumbly, discolored with subungual debris. Hyperkeratotic tissue is present. Seen sub L hallux    Assessment:   Diagnosis Orders   1. Hereditary sensory neuropathy        2. Dermatophytosis of nail        3. Corns and callosities                Plan:  All nails as mentioned above debrided in length and thickness. Paring of 1 benign hyperkeratotic lesions (as listed above) took place with #10 blade or tissue nippers. Patient advised OTC methods for treatment of the mycotic nails. Patient will follow up in 10 weeks.

## 2022-11-10 ENCOUNTER — OFFICE VISIT (OUTPATIENT)
Dept: PODIATRY | Age: 87
End: 2022-11-10
Payer: MEDICARE

## 2022-11-10 VITALS
HEART RATE: 64 BPM | WEIGHT: 168.2 LBS | SYSTOLIC BLOOD PRESSURE: 120 MMHG | DIASTOLIC BLOOD PRESSURE: 64 MMHG | RESPIRATION RATE: 20 BRPM | BODY MASS INDEX: 24.84 KG/M2

## 2022-11-10 DIAGNOSIS — G60.8 HEREDITARY SENSORY NEUROPATHY: Primary | ICD-10-CM

## 2022-11-10 DIAGNOSIS — B35.1 DERMATOPHYTOSIS OF NAIL: ICD-10-CM

## 2022-11-10 DIAGNOSIS — L84 CORNS AND CALLOSITIES: ICD-10-CM

## 2022-11-10 PROCEDURE — 11721 DEBRIDE NAIL 6 OR MORE: CPT | Performed by: PODIATRIST

## 2022-11-10 PROCEDURE — 99999 PR OFFICE/OUTPT VISIT,PROCEDURE ONLY: CPT | Performed by: PODIATRIST

## 2022-11-10 NOTE — PROGRESS NOTES
Foot Care Worksheet  PCP: Andrea Engle MD  Last visit: 06 / 09 / 2022        Nail description: Thick , Yellow , Crumbly , Marked limitation of ambulation     Pain resulting from thickened and dystrophy of nail plate No    Nails involved  Right   1, 2, 3, 4, 5  (T5-T9)  Left     1, 2, 3, 4, 5  (TA-T4)    Q7 1 Class A Finding - Non traumatic amputation of foot No    Q8 2 Class B Findings - Absent DP pulse No, Absent PT pulse No, Advanced tropic changes (3 required) Yes    Decrease hair growth Yes, Nail changes/thickening Yes, Pigmented changes/discoloration Yes, Skin texture (thin, shiny) No, Skin color (rubor/redness) No    Q9 1 Class B and 2 Class C Findings  Claudication No, Temperature change Yes, Paresthesia No, Burning No, Edema Yes  Subjective:  Patient presents to [facility] care. Patient denies any new problems with their feet. Allergies   Allergen Reactions    Sulfa Antibiotics Other (See Comments)     Other reaction(s): Intolerance-unknown  Other reaction(s): Unknown  Other reaction(s): edema    Sulfamethoxazole      Other reaction(s): Intolerance-unknown    Sulfamethoxazole-Trimethoprim Other (See Comments)     Other reaction(s): Unknown    Trimethoprim      Other reaction(s): Intolerance-unknown  unknown    Warfarin      Other reaction(s): Bleeding (non-specific)  Bleeding (non-specific)       Past Medical History:   Diagnosis Date    Atrial fibrillation (HCC)     HTN (hypertension)     Hyperlipemia        Prior to Admission medications    Medication Sig Start Date End Date Taking? Authorizing Provider   mupirocin (BACTROBAN) 2 % ointment Apply topically 2 times daily.  6/4/20  Yes Ramon Espinal DPM   Calcium Carb-Cholecalciferol 600-800 MG-UNIT CHEW    Yes Historical Provider, MD   metoprolol succinate (TOPROL XL) 25 MG extended release tablet TAKE 1/2 TABLET BY MOUTH ONE TIME A DAY  1/8/18  Yes Historical Provider, MD   Omega-3 Fatty Acids (FISH OIL PO) Take 2 g by mouth   Yes Historical Provider, MD   atorvastatin (LIPITOR) 40 MG tablet TAKE 1 TABLET BY MOUTH ONE TIME A DAY 7/25/18  Yes Historical Provider, MD   lisinopril (PRINIVIL;ZESTRIL) 40 MG tablet  8/27/18  Yes Historical Provider, MD   apixaban (ELIQUIS) 5 MG TABS tablet Take by mouth 2 times daily   Yes Historical Provider, MD   finasteride (PROSCAR) 5 MG tablet Take 5 mg by mouth daily   Yes Historical Provider, MD       Social History     Tobacco Use    Smoking status: Former    Smokeless tobacco: Never   Substance Use Topics    Alcohol use: No     Review of Systems: All 12 systems reviewed and pertinent positives noted above. Objective:  Vascular: DP and PT pulses palpable 2/4, bilateral.  CFT <3 seconds, bilateral.  Hair growth present to the level of the digits, bilateral.  Edema absent, bilateral.  Varicosities absent, bilateral. Erythema absent, bilateral. Distal Rubor absent bilateral.  Temperature within normal limits bilateral. Hyperpigmentation absent bilateral. No atrophic skin. Neurological: Sensation Impaired to light touch to level of digits, bilateral.  Protective sensation intact  0/10 sites via 5.07/10g [place]-Lori Monofilament, bilateral.  negative Tinel's, bilateral.  negative Valleix sign, bilateral.  Vibratory abnormal  bilateral.  Reflexes Decreased bilateral.  Paresthesias positive. Dysthesias negative. Sharp/dull abnormal bilateral.    Integument:  Nails left 1, 2, 3, 4, 5 and right 1, 2, 3, 4, 5 thickened, dystrophic and crumbly, discolored with subungual debris. Hyperkeratotic tissue is absent. Seen sub L hallux    Assessment:   Diagnosis Orders   1. Hereditary sensory neuropathy        2. Dermatophytosis of nail        3. Corns and callosities                Plan:  All nails as mentioned above debrided in length and thickness. Patient advised OTC methods for treatment of the mycotic nails. Patient will follow up in 10 weeks.

## 2023-02-08 ENCOUNTER — OFFICE VISIT (OUTPATIENT)
Dept: PODIATRY | Age: 88
End: 2023-02-08
Payer: MEDICARE

## 2023-02-08 VITALS
SYSTOLIC BLOOD PRESSURE: 138 MMHG | WEIGHT: 170.4 LBS | BODY MASS INDEX: 25.16 KG/M2 | DIASTOLIC BLOOD PRESSURE: 72 MMHG | RESPIRATION RATE: 20 BRPM | HEART RATE: 76 BPM

## 2023-02-08 DIAGNOSIS — G60.8 HEREDITARY SENSORY NEUROPATHY: Primary | ICD-10-CM

## 2023-02-08 DIAGNOSIS — B35.1 DERMATOPHYTOSIS OF NAIL: ICD-10-CM

## 2023-02-08 PROCEDURE — 11721 DEBRIDE NAIL 6 OR MORE: CPT | Performed by: PODIATRIST

## 2023-02-08 NOTE — PROGRESS NOTES
Foot Care Worksheet  PCP: Louie Hopkins MD  Last visit: 12 / 8 / 2022        Nail description: Thick , Yellow , Crumbly , Marked limitation of ambulation     Pain resulting from thickened and dystrophy of nail plate No    Nails involved  Right   1, 2, 3, 4, 5  (T5-T9)  Left     1, 2, 3, 4, 5  (TA-T4)    Q7 1 Class A Finding - Non traumatic amputation of foot No    Q8 2 Class B Findings - Absent DP pulse No, Absent PT pulse No, Advanced tropic changes (3 required) Yes    Decrease hair growth Yes, Nail changes/thickening Yes, Pigmented changes/discoloration Yes, Skin texture (thin, shiny) No, Skin color (rubor/redness) No    Q9 1 Class B and 2 Class C Findings  Claudication No, Temperature change Yes, Paresthesia No, Burning No, Edema Yes    Subjective:  Patient presents to [facility] today for routine foot care. Patient denies any new problems with their feet. Allergies   Allergen Reactions    Sulfa Antibiotics Other (See Comments)     Other reaction(s): Intolerance-unknown  Other reaction(s): Unknown  Other reaction(s): edema    Sulfamethoxazole      Other reaction(s): Intolerance-unknown    Sulfamethoxazole-Trimethoprim Other (See Comments)     Other reaction(s): Unknown    Trimethoprim      Other reaction(s): Intolerance-unknown  unknown    Warfarin      Other reaction(s): Bleeding (non-specific)  Bleeding (non-specific)       Past Medical History:   Diagnosis Date    Atrial fibrillation (HCC)     HTN (hypertension)     Hyperlipemia        Prior to Admission medications    Medication Sig Start Date End Date Taking? Authorizing Provider   mupirocin (BACTROBAN) 2 % ointment Apply topically 2 times daily.  6/4/20  Yes Catherine Cooper DPM   Calcium Carb-Cholecalciferol 600-800 MG-UNIT CHEW    Yes Historical Provider, MD   metoprolol succinate (TOPROL XL) 25 MG extended release tablet TAKE 1/2 TABLET BY MOUTH ONE TIME A DAY  1/8/18  Yes Historical Provider, MD   Omega-3 Fatty Acids (FISH OIL PO) Take 2 g by mouth   Yes Historical Provider, MD   atorvastatin (LIPITOR) 40 MG tablet TAKE 1 TABLET BY MOUTH ONE TIME A DAY 7/25/18  Yes Historical Provider, MD   lisinopril (PRINIVIL;ZESTRIL) 40 MG tablet  8/27/18  Yes Historical Provider, MD   apixaban (ELIQUIS) 5 MG TABS tablet Take by mouth 2 times daily   Yes Historical Provider, MD   finasteride (PROSCAR) 5 MG tablet Take 5 mg by mouth daily   Yes Historical Provider, MD       Social History     Tobacco Use    Smoking status: Former    Smokeless tobacco: Never   Substance Use Topics    Alcohol use: No     Review of Systems: All 12 systems reviewed and pertinent positives noted above. Objective:  Vascular: DP and PT pulses palpable 2/4, bilateral.  CFT <3 seconds, bilateral.  Hair growth present to the level of the digits, bilateral.  Edema absent, bilateral.  Varicosities absent, bilateral. Erythema absent, bilateral. Distal Rubor absent bilateral.  Temperature within normal limits bilateral. Hyperpigmentation absent bilateral. No atrophic skin. Neurological: Sensation Impaired to light touch to level of digits, bilateral.  Protective sensation intact  0/10 sites via 5.07/10g [place]-Lori Monofilament, bilateral.  negative Tinel's, bilateral.  negative Valleix sign, bilateral.  Vibratory abnormal  bilateral.  Reflexes Decreased bilateral.  Paresthesias positive. Dysthesias negative. Sharp/dull abnormal bilateral.    Integument:  Nails left 1, 2, 3, 4, 5 and right 1, 2, 3, 4, 5 thickened, dystrophic and crumbly, discolored with subungual debris. Hyperkeratotic tissue is absent. Seen sub L hallux    Assessment:   Diagnosis Orders   1. Hereditary sensory neuropathy        2. Dermatophytosis of nail                Plan:  All nails as mentioned above debrided in length and thickness. Patient advised OTC methods for treatment of the mycotic nails. Patient will follow up in 10 weeks.

## 2023-07-26 ENCOUNTER — OFFICE VISIT (OUTPATIENT)
Dept: PODIATRY | Age: 88
End: 2023-07-26
Payer: MEDICARE

## 2023-07-26 VITALS — WEIGHT: 168 LBS | BODY MASS INDEX: 24.88 KG/M2 | HEIGHT: 69 IN

## 2023-07-26 DIAGNOSIS — L84 CORNS AND CALLOSITIES: ICD-10-CM

## 2023-07-26 DIAGNOSIS — B35.1 DERMATOPHYTOSIS OF NAIL: ICD-10-CM

## 2023-07-26 DIAGNOSIS — G60.8 HEREDITARY SENSORY NEUROPATHY: Primary | ICD-10-CM

## 2023-07-26 PROCEDURE — 11721 DEBRIDE NAIL 6 OR MORE: CPT | Performed by: PODIATRIST

## 2023-07-26 PROCEDURE — 99999 PR OFFICE/OUTPT VISIT,PROCEDURE ONLY: CPT | Performed by: PODIATRIST

## 2023-07-26 NOTE — PROGRESS NOTES
Foot Care Worksheet  PCP: Donna Mckeon MD  Last visit: 12 / 8 / 2022        Nail description: Thick , Yellow , Crumbly , Marked limitation of ambulation     Pain resulting from thickened and dystrophy of nail plate No    Nails involved  Right   1, 2, 3, 4, 5  (T5-T9)  Left     1, 2, 3, 4, 5  (TA-T4)    Q7 1 Class A Finding - Non traumatic amputation of foot No    Q8 2 Class B Findings - Absent DP pulse No, Absent PT pulse No, Advanced tropic changes (3 required) Yes    Decrease hair growth Yes, Nail changes/thickening Yes, Pigmented changes/discoloration Yes, Skin texture (thin, shiny) No, Skin color (rubor/redness) No    Q9 1 Class B and 2 Class C Findings  Claudication No, Temperature change Yes, Paresthesia No, Burning No, Edema Yes    Subjective:  Patient presents to [facility] today for routine foot care. Patient denies any new problems with their feet. Allergies   Allergen Reactions    Sulfa Antibiotics Other (See Comments)     Other reaction(s): Intolerance-unknown  Other reaction(s): Unknown  Other reaction(s): edema    Sulfamethoxazole      Other reaction(s): Intolerance-unknown    Sulfamethoxazole-Trimethoprim Other (See Comments)     Other reaction(s): Unknown    Trimethoprim      Other reaction(s): Intolerance-unknown  unknown    Warfarin      Other reaction(s): Bleeding (non-specific)  Bleeding (non-specific)       Past Medical History:   Diagnosis Date    Atrial fibrillation (HCC)     HTN (hypertension)     Hyperlipemia        Prior to Admission medications    Medication Sig Start Date End Date Taking? Authorizing Provider   mupirocin (BACTROBAN) 2 % ointment Apply topically 2 times daily.  6/4/20  Yes Chelle Peñaloza DPM   Calcium Carb-Cholecalciferol 600-800 MG-UNIT CHEW    Yes Historical Provider, MD   metoprolol succinate (TOPROL XL) 25 MG extended release tablet TAKE 1/2 TABLET BY MOUTH ONE TIME A DAY  1/8/18  Yes Historical Provider, MD   Omega-3 Fatty Acids (FISH OIL PO) Take 2 g

## 2023-10-11 ENCOUNTER — OFFICE VISIT (OUTPATIENT)
Dept: PODIATRY | Age: 88
End: 2023-10-11
Payer: MEDICARE

## 2023-10-11 VITALS
DIASTOLIC BLOOD PRESSURE: 70 MMHG | WEIGHT: 170.8 LBS | BODY MASS INDEX: 25.22 KG/M2 | RESPIRATION RATE: 20 BRPM | SYSTOLIC BLOOD PRESSURE: 124 MMHG | HEART RATE: 80 BPM

## 2023-10-11 DIAGNOSIS — G60.8 HEREDITARY SENSORY NEUROPATHY: Primary | ICD-10-CM

## 2023-10-11 DIAGNOSIS — B35.1 DERMATOPHYTOSIS OF NAIL: ICD-10-CM

## 2023-10-11 PROCEDURE — 99999 PR OFFICE/OUTPT VISIT,PROCEDURE ONLY: CPT | Performed by: PODIATRIST

## 2023-10-11 PROCEDURE — 11721 DEBRIDE NAIL 6 OR MORE: CPT | Performed by: PODIATRIST

## 2023-10-11 NOTE — PROGRESS NOTES
Foot Care Worksheet  PCP: Mira Aceves MD  Last visit: 06 / 08 / 2022        Nail description: Thick , Yellow , Crumbly , Marked limitation of ambulation     Pain resulting from thickened and dystrophy of nail plate No    Nails involved  Right   1, 2, 3, 4, 5  (T5-T9)  Left     1, 2, 3, 4, 5  (TA-T4)    Q7 1 Class A Finding - Non traumatic amputation of foot No    Q8 2 Class B Findings - Absent DP pulse No, Absent PT pulse No, Advanced tropic changes (3 required) Yes    Decrease hair growth Yes, Nail changes/thickening Yes, Pigmented changes/discoloration Yes, Skin texture (thin, shiny) No, Skin color (rubor/redness) No    Q9 1 Class B and 2 Class C Findings  Claudication No, Temperature change Yes, Paresthesia No, Burning No, Edema Yes    Subjective:  Patient presents to [facility] today for routine foot care. Patient denies any new problems with their feet. Allergies   Allergen Reactions    Sulfa Antibiotics Other (See Comments)     Other reaction(s): Intolerance-unknown  Other reaction(s): Unknown  Other reaction(s): edema    Sulfamethoxazole      Other reaction(s): Intolerance-unknown    Sulfamethoxazole-Trimethoprim Other (See Comments)     Other reaction(s): Unknown    Trimethoprim      Other reaction(s): Intolerance-unknown  unknown    Warfarin      Other reaction(s): Bleeding (non-specific)  Bleeding (non-specific)       Past Medical History:   Diagnosis Date    Atrial fibrillation (HCC)     HTN (hypertension)     Hyperlipemia        Prior to Admission medications    Medication Sig Start Date End Date Taking? Authorizing Provider   mupirocin (BACTROBAN) 2 % ointment Apply topically 2 times daily.  6/4/20  Yes Hassel Jeans, DPM   Calcium Carb-Cholecalciferol 600-800 MG-UNIT CHEW    Yes ProviderAditya MD   metoprolol succinate (TOPROL XL) 25 MG extended release tablet TAKE 1/2 TABLET BY MOUTH ONE TIME A DAY  1/8/18  Yes Aditya Cardoza MD   Omega-3 Fatty Acids (FISH OIL PO)

## 2023-12-13 ENCOUNTER — OFFICE VISIT (OUTPATIENT)
Dept: PODIATRY | Age: 88
End: 2023-12-13
Payer: MEDICARE

## 2023-12-13 VITALS
HEART RATE: 60 BPM | WEIGHT: 175.8 LBS | DIASTOLIC BLOOD PRESSURE: 62 MMHG | BODY MASS INDEX: 25.96 KG/M2 | RESPIRATION RATE: 20 BRPM | SYSTOLIC BLOOD PRESSURE: 120 MMHG

## 2023-12-13 DIAGNOSIS — G60.8 HEREDITARY SENSORY NEUROPATHY: Primary | ICD-10-CM

## 2023-12-13 DIAGNOSIS — B35.1 DERMATOPHYTOSIS OF NAIL: ICD-10-CM

## 2023-12-13 PROCEDURE — 11721 DEBRIDE NAIL 6 OR MORE: CPT | Performed by: PODIATRIST

## 2023-12-13 PROCEDURE — 99999 PR OFFICE/OUTPT VISIT,PROCEDURE ONLY: CPT | Performed by: PODIATRIST

## 2023-12-13 NOTE — PROGRESS NOTES
Take 2 g by mouth   Yes Provider, MD Aditya   atorvastatin (LIPITOR) 40 MG tablet TAKE 1 TABLET BY MOUTH ONE TIME A DAY 7/25/18  Yes Aditya Cardoza MD   lisinopril (PRINIVIL;ZESTRIL) 40 MG tablet  8/27/18  Yes ProviderAditya MD   apixaban (ELIQUIS) 5 MG TABS tablet Take by mouth 2 times daily   Yes Aditya Cardoza MD   finasteride (PROSCAR) 5 MG tablet Take 1 tablet by mouth daily   Yes Provider, MD Aditya       Social History     Tobacco Use    Smoking status: Former    Smokeless tobacco: Never   Substance Use Topics    Alcohol use: No     Review of Systems: All 12 systems reviewed and pertinent positives noted above. Objective:  Vascular: DP and PT pulses palpable 2/4, bilateral.  CFT <3 seconds, bilateral.  Hair growth present to the level of the digits, bilateral.  Edema absent, bilateral.  Varicosities absent, bilateral. Erythema absent, bilateral. Distal Rubor absent bilateral.  Temperature within normal limits bilateral. Hyperpigmentation absent bilateral. No atrophic skin. Neurological: Sensation Impaired to light touch to level of digits, bilateral.  Protective sensation intact  0/10 sites via 5.07/10g [place]-Lori Monofilament, bilateral.  negative Tinel's, bilateral.  negative Valleix sign, bilateral.  Vibratory abnormal  bilateral.  Reflexes Decreased bilateral.  Paresthesias positive. Dysthesias negative. Sharp/dull abnormal bilateral.    Integument:  Nails left 1, 2, 3, 4, 5 and right 1, 2, 3, 4, 5 thickened, dystrophic and crumbly, discolored with subungual debris. Hyperkeratotic tissue is absent. Seen sub L hallux    Assessment:   Diagnosis Orders   1. Hereditary sensory neuropathy        2. Dermatophytosis of nail              Plan:  All nails as mentioned above debrided in length and thickness. Patient advised OTC methods for treatment of the mycotic nails. Patient will follow up in 10 weeks.

## 2024-02-28 ENCOUNTER — OFFICE VISIT (OUTPATIENT)
Dept: PODIATRY | Age: 89
End: 2024-02-28
Payer: MEDICARE

## 2024-02-28 VITALS
SYSTOLIC BLOOD PRESSURE: 138 MMHG | HEIGHT: 69 IN | DIASTOLIC BLOOD PRESSURE: 82 MMHG | WEIGHT: 171 LBS | BODY MASS INDEX: 25.33 KG/M2 | HEART RATE: 60 BPM

## 2024-02-28 DIAGNOSIS — G60.8 HEREDITARY SENSORY NEUROPATHY: Primary | ICD-10-CM

## 2024-02-28 DIAGNOSIS — B35.1 DERMATOPHYTOSIS OF NAIL: ICD-10-CM

## 2024-02-28 PROCEDURE — 99999 PR OFFICE/OUTPT VISIT,PROCEDURE ONLY: CPT | Performed by: PODIATRIST

## 2024-02-28 PROCEDURE — 11721 DEBRIDE NAIL 6 OR MORE: CPT | Performed by: PODIATRIST

## 2024-02-28 NOTE — PROGRESS NOTES
mouth   Yes Provider, MD Aditya   atorvastatin (LIPITOR) 40 MG tablet TAKE 1 TABLET BY MOUTH ONE TIME A DAY 7/25/18  Yes Provider, MD Aditya   lisinopril (PRINIVIL;ZESTRIL) 40 MG tablet  8/27/18  Yes Provider, MD Aditya   apixaban (ELIQUIS) 5 MG TABS tablet Take by mouth 2 times daily   Yes ProviderAditya MD   finasteride (PROSCAR) 5 MG tablet Take 1 tablet by mouth daily   Yes Provider, MD Aditya       Social History     Tobacco Use    Smoking status: Former    Smokeless tobacco: Never   Substance Use Topics    Alcohol use: No     Review of Systems: All 12 systems reviewed and pertinent positives noted above.  Objective:  Vascular: DP and PT pulses palpable 2/4, bilateral.  CFT <3 seconds, bilateral.  Hair growth present to the level of the digits, bilateral.  Edema absent, bilateral.  Varicosities absent, bilateral. Erythema absent, bilateral. Distal Rubor absent bilateral.  Temperature within normal limits bilateral. Hyperpigmentation absent bilateral. No atrophic skin.    Neurological: Sensation Impaired to light touch to level of digits, bilateral.  Protective sensation intact  0/10 sites via 5.07/10g [place]-Lori Monofilament, bilateral.  negative Tinel's, bilateral.  negative Valleix sign, bilateral.  Vibratory abnormal  bilateral.  Reflexes Decreased bilateral.  Paresthesias positive.  Dysthesias negative.  Sharp/dull abnormal bilateral.    Integument:  Nails left 1, 2, 3, 4, 5 and right 1, 2, 3, 4, 5 thickened, dystrophic and crumbly, discolored with subungual debris.  Hyperkeratotic tissue is absent. Seen sub L hallux    Assessment:   Diagnosis Orders   1. Hereditary sensory neuropathy        2. Dermatophytosis of nail              Plan:  All nails as mentioned above debrided in length and thickness.  Patient advised OTC methods for treatment of the mycotic nails.  Patient will follow up in 10 weeks.

## 2024-05-08 ENCOUNTER — OFFICE VISIT (OUTPATIENT)
Dept: PODIATRY | Age: 89
End: 2024-05-08
Payer: MEDICARE

## 2024-05-08 VITALS
DIASTOLIC BLOOD PRESSURE: 60 MMHG | BODY MASS INDEX: 25.33 KG/M2 | HEIGHT: 69 IN | HEART RATE: 61 BPM | WEIGHT: 171 LBS | SYSTOLIC BLOOD PRESSURE: 110 MMHG

## 2024-05-08 DIAGNOSIS — B35.1 DERMATOPHYTOSIS OF NAIL: ICD-10-CM

## 2024-05-08 DIAGNOSIS — G60.8 HEREDITARY SENSORY NEUROPATHY: Primary | ICD-10-CM

## 2024-05-08 PROCEDURE — 99999 PR OFFICE/OUTPT VISIT,PROCEDURE ONLY: CPT | Performed by: PODIATRIST

## 2024-05-08 PROCEDURE — 11721 DEBRIDE NAIL 6 OR MORE: CPT | Performed by: PODIATRIST

## 2024-05-08 NOTE — PROGRESS NOTES
Foot Care Worksheet  PCP: Luan Pearson DO  Last visit: 12/13/23        Nail description: Thick , Yellow , Crumbly , Marked limitation of ambulation     Pain resulting from thickened and dystrophy of nail plate No    Nails involved  Right   1, 2, 3, 4, 5  (T5-T9)  Left     1, 2, 3, 4, 5  (TA-T4)    Q7 1 Class A Finding - Non traumatic amputation of foot No    Q8 2 Class B Findings - Absent DP pulse No, Absent PT pulse No, Advanced tropic changes (3 required) Yes    Decrease hair growth Yes, Nail changes/thickening Yes, Pigmented changes/discoloration Yes, Skin texture (thin, shiny) No, Skin color (rubor/redness) No    Q9 1 Class B and 2 Class C Findings  Claudication No, Temperature change Yes, Paresthesia No, Burning No, Edema Yes    Subjective:  [facility].  Patient denies any new problems with their feet.  Allergies   Allergen Reactions    Sulfa Antibiotics Other (See Comments)     Other reaction(s): Intolerance-unknown  Other reaction(s): Unknown  Other reaction(s): edema    Sulfamethoxazole      Other reaction(s): Intolerance-unknown    Sulfamethoxazole-Trimethoprim Other (See Comments)     Other reaction(s): Unknown    Trimethoprim      Other reaction(s): Intolerance-unknown  unknown    Warfarin      Other reaction(s): Bleeding (non-specific)  Bleeding (non-specific)       Past Medical History:   Diagnosis Date    Atrial fibrillation (HCC)     HTN (hypertension)     Hyperlipemia        Prior to Admission medications    Medication Sig Start Date End Date Taking? Authorizing Provider   mupirocin (BACTROBAN) 2 % ointment Apply topically 2 times daily. 6/4/20  Yes Terrance Card DPM   Calcium Carb-Cholecalciferol 600-800 MG-UNIT CHEW    Yes Aditya Cardoza MD   metoprolol succinate (TOPROL XL) 25 MG extended release tablet TAKE 1/2 TABLET BY MOUTH ONE TIME A DAY  1/8/18  Yes Aditya Cardoza MD   Omega-3 Fatty Acids (FISH OIL PO) Take 2 g by

## 2024-07-18 ENCOUNTER — OFFICE VISIT (OUTPATIENT)
Dept: PODIATRY | Age: 89
End: 2024-07-18
Payer: MEDICARE

## 2024-07-18 VITALS
RESPIRATION RATE: 20 BRPM | WEIGHT: 175.6 LBS | DIASTOLIC BLOOD PRESSURE: 64 MMHG | HEART RATE: 64 BPM | SYSTOLIC BLOOD PRESSURE: 124 MMHG | BODY MASS INDEX: 25.93 KG/M2

## 2024-07-18 DIAGNOSIS — G60.8 HEREDITARY SENSORY NEUROPATHY: Primary | ICD-10-CM

## 2024-07-18 DIAGNOSIS — B35.1 DERMATOPHYTOSIS OF NAIL: ICD-10-CM

## 2024-07-18 DIAGNOSIS — L84 CORNS AND CALLOSITIES: ICD-10-CM

## 2024-07-18 PROCEDURE — 11721 DEBRIDE NAIL 6 OR MORE: CPT | Performed by: PODIATRIST

## 2024-07-18 PROCEDURE — 99999 PR OFFICE/OUTPT VISIT,PROCEDURE ONLY: CPT | Performed by: PODIATRIST

## 2024-07-18 PROCEDURE — 11055 PARING/CUTG B9 HYPRKER LES 1: CPT | Performed by: PODIATRIST

## 2024-07-18 NOTE — PROGRESS NOTES
tissue nippers.  Patient advised OTC methods for treatment of the mycotic nails.  Patient will follow up in 10 weeks.

## 2024-09-25 ENCOUNTER — OFFICE VISIT (OUTPATIENT)
Dept: PODIATRY | Age: 89
End: 2024-09-25
Payer: MEDICARE

## 2024-09-25 VITALS
BODY MASS INDEX: 24.88 KG/M2 | DIASTOLIC BLOOD PRESSURE: 80 MMHG | WEIGHT: 168 LBS | HEART RATE: 68 BPM | HEIGHT: 69 IN | SYSTOLIC BLOOD PRESSURE: 132 MMHG

## 2024-09-25 DIAGNOSIS — B35.1 DERMATOPHYTOSIS OF NAIL: ICD-10-CM

## 2024-09-25 DIAGNOSIS — L84 CORNS AND CALLOSITIES: ICD-10-CM

## 2024-09-25 DIAGNOSIS — G60.8 HEREDITARY SENSORY NEUROPATHY: Primary | ICD-10-CM

## 2024-09-25 PROCEDURE — 99999 PR OFFICE/OUTPT VISIT,PROCEDURE ONLY: CPT | Performed by: PODIATRIST

## 2024-09-25 PROCEDURE — 11055 PARING/CUTG B9 HYPRKER LES 1: CPT | Performed by: PODIATRIST

## 2024-09-25 PROCEDURE — 11721 DEBRIDE NAIL 6 OR MORE: CPT | Performed by: PODIATRIST

## 2024-12-11 ENCOUNTER — OFFICE VISIT (OUTPATIENT)
Dept: PODIATRY | Age: 88
End: 2024-12-11
Payer: MEDICARE

## 2024-12-11 VITALS
WEIGHT: 168.8 LBS | SYSTOLIC BLOOD PRESSURE: 126 MMHG | HEART RATE: 72 BPM | RESPIRATION RATE: 20 BRPM | DIASTOLIC BLOOD PRESSURE: 64 MMHG | BODY MASS INDEX: 24.93 KG/M2

## 2024-12-11 DIAGNOSIS — G60.8 HEREDITARY SENSORY NEUROPATHY: Primary | ICD-10-CM

## 2024-12-11 DIAGNOSIS — B35.1 DERMATOPHYTOSIS OF NAIL: ICD-10-CM

## 2024-12-11 DIAGNOSIS — L84 CORNS AND CALLOSITIES: ICD-10-CM

## 2024-12-11 PROCEDURE — 11055 PARING/CUTG B9 HYPRKER LES 1: CPT | Performed by: PODIATRIST

## 2024-12-11 PROCEDURE — 11721 DEBRIDE NAIL 6 OR MORE: CPT | Performed by: PODIATRIST

## 2024-12-11 PROCEDURE — 99999 PR OFFICE/OUTPT VISIT,PROCEDURE ONLY: CPT | Performed by: PODIATRIST

## 2024-12-11 NOTE — PROGRESS NOTES
Foot Care Worksheet  PCP: Luan Pearson DO  Last visit: 10/03/2024        Nail description: Thick , Yellow , Crumbly , Marked limitation of ambulation     Pain resulting from thickened and dystrophy of nail plate No    Nails involved  Right   1, 2, 3, 4, 5  (T5-T9)  Left     1, 2, 3, 4, 5  (TA-T4)    Q7 1 Class A Finding - Non traumatic amputation of foot No    Q8 2 Class B Findings - Absent DP pulse No, Absent PT pulse No, Advanced tropic changes (3 required) Yes    Decrease hair growth Yes, Nail changes/thickening Yes, Pigmented changes/discoloration Yes, Skin texture (thin, shiny) No, Skin color (rubor/redness) No    Q9 1 Class B and 2 Class C Findings  Claudication No, Temperature change Yes, Paresthesia No, Burning No, Edema Yes    Subjective:  Patient presents to [facility] today for routine foot care.  Patient denies any new problems with their feet.  Allergies   Allergen Reactions    Sulfa Antibiotics Other (See Comments)     Other reaction(s): Intolerance-unknown  Other reaction(s): Unknown  Other reaction(s): edema    Sulfamethoxazole      Other reaction(s): Intolerance-unknown    Sulfamethoxazole-Trimethoprim Other (See Comments)     Other reaction(s): Unknown    Trimethoprim      Other reaction(s): Intolerance-unknown  unknown    Warfarin      Other reaction(s): Bleeding (non-specific)  Bleeding (non-specific)       Past Medical History:   Diagnosis Date    Atrial fibrillation (HCC)     HTN (hypertension)     Hyperlipemia        Prior to Admission medications    Medication Sig Start Date End Date Taking? Authorizing Provider   mupirocin (BACTROBAN) 2 % ointment Apply topically 2 times daily. 6/4/20  Yes Terrance Card DPM   Calcium Carb-Cholecalciferol 600-800 MG-UNIT CHEW    Yes Aditya Cardoza MD   metoprolol succinate (TOPROL XL) 25 MG extended release tablet  1/8/18  Yes Aditya Cardoza MD   Omega-3 Fatty Acids (FISH OIL PO) Take 2 g by mouth   Yes Aditya Cardoza MD

## 2025-02-26 ENCOUNTER — OFFICE VISIT (OUTPATIENT)
Dept: PODIATRY | Age: 89
End: 2025-02-26
Payer: MEDICARE

## 2025-02-26 VITALS
SYSTOLIC BLOOD PRESSURE: 132 MMHG | WEIGHT: 174 LBS | BODY MASS INDEX: 25.77 KG/M2 | HEART RATE: 53 BPM | DIASTOLIC BLOOD PRESSURE: 82 MMHG | HEIGHT: 69 IN

## 2025-02-26 DIAGNOSIS — B35.1 DERMATOPHYTOSIS OF NAIL: ICD-10-CM

## 2025-02-26 DIAGNOSIS — G60.8 HEREDITARY SENSORY NEUROPATHY: Primary | ICD-10-CM

## 2025-02-26 PROCEDURE — 11721 DEBRIDE NAIL 6 OR MORE: CPT | Performed by: PODIATRIST

## 2025-02-26 NOTE — PROGRESS NOTES
Foot Care Worksheet  PCP: Luan Pearson DO  Last visit: 10/03/2024        Nail description: Thick , Yellow , Crumbly , Marked limitation of ambulation     Pain resulting from thickened and dystrophy of nail plate No    Nails involved  Right   1, 2, 3, 4, 5  (T5-T9)  Left     1, 2, 3, 4, 5  (TA-T4)    Q7 1 Class A Finding - Non traumatic amputation of foot No    Q8 2 Class B Findings - Absent DP pulse No, Absent PT pulse No, Advanced tropic changes (3 required) Yes    Decrease hair growth Yes, Nail changes/thickening Yes, Pigmented changes/discoloration Yes, Skin texture (thin, shiny) No, Skin color (rubor/redness) No    Q9 1 Class B and 2 Class C Findings  Claudication No, Temperature change Yes, Paresthesia No, Burning No, Edema Yes    Subjective:  Patient presents to [facility] today for routine foot care.  Patient denies any new problems with their feet.  Allergies   Allergen Reactions    Sulfa Antibiotics Other (See Comments)     Other reaction(s): Intolerance-unknown  Other reaction(s): Unknown  Other reaction(s): edema    Sulfamethoxazole      Other reaction(s): Intolerance-unknown    Sulfamethoxazole-Trimethoprim Other (See Comments)     Other reaction(s): Unknown    Trimethoprim      Other reaction(s): Intolerance-unknown  unknown    Warfarin      Other reaction(s): Bleeding (non-specific)  Bleeding (non-specific)       Past Medical History:   Diagnosis Date    Atrial fibrillation (HCC)     HTN (hypertension)     Hyperlipemia        Prior to Admission medications    Medication Sig Start Date End Date Taking? Authorizing Provider   mupirocin (BACTROBAN) 2 % ointment Apply topically 2 times daily. 6/4/20  Yes Terrance Card DPM   Calcium Carb-Cholecalciferol 600-800 MG-UNIT CHEW    Yes Aditya Cardoza MD   metoprolol succinate (TOPROL XL) 25 MG extended release tablet  1/8/18  Yes Aditya Cardoza MD   Omega-3 Fatty Acids (FISH OIL PO) Take 2 g by mouth   Yes Aditya Cardoza MD

## 2025-05-28 ENCOUNTER — OFFICE VISIT (OUTPATIENT)
Dept: PODIATRY | Age: 89
End: 2025-05-28
Payer: MEDICARE

## 2025-05-28 VITALS
BODY MASS INDEX: 24.88 KG/M2 | WEIGHT: 168 LBS | SYSTOLIC BLOOD PRESSURE: 120 MMHG | DIASTOLIC BLOOD PRESSURE: 70 MMHG | HEART RATE: 55 BPM | HEIGHT: 69 IN

## 2025-05-28 DIAGNOSIS — B35.1 DERMATOPHYTOSIS OF NAIL: ICD-10-CM

## 2025-05-28 DIAGNOSIS — G60.8 HEREDITARY SENSORY NEUROPATHY: Primary | ICD-10-CM

## 2025-05-28 PROCEDURE — 11721 DEBRIDE NAIL 6 OR MORE: CPT | Performed by: PODIATRIST

## 2025-05-28 NOTE — PROGRESS NOTES
Foot Care Worksheet  PCP: Luan Pearson DO  Last visit: 3/6/25        Nail description: Thick , Yellow , Crumbly , Marked limitation of ambulation     Pain resulting from thickened and dystrophy of nail plate No    Nails involved  Right   1, 2, 3, 4, 5  (T5-T9)  Left     1, 2, 3, 4, 5  (TA-T4)    Q7 1 Class A Finding - Non traumatic amputation of foot No    Q8 2 Class B Findings - Absent DP pulse No, Absent PT pulse No, Advanced tropic changes (3 required) Yes    Decrease hair growth Yes, Nail changes/thickening Yes, Pigmented changes/discoloration Yes, Skin texture (thin, shiny) No, Skin color (rubor/redness) No    Q9 1 Class B and 2 Class C Findings  Claudication No, Temperature change Yes, Paresthesia No, Burning No, Edema Yes    Subjective:  [facility].  Patient denies any new problems with their feet.  Allergies   Allergen Reactions    Sulfa Antibiotics Other (See Comments)     Other reaction(s): Intolerance-unknown  Other reaction(s): Unknown  Other reaction(s): edema    Sulfamethoxazole      Other reaction(s): Intolerance-unknown    Sulfamethoxazole-Trimethoprim Other (See Comments)     Other reaction(s): Unknown    Trimethoprim      Other reaction(s): Intolerance-unknown  unknown    Warfarin      Other reaction(s): Bleeding (non-specific)  Bleeding (non-specific)       Past Medical History:   Diagnosis Date    Atrial fibrillation (HCC)     HTN (hypertension)     Hyperlipemia        Prior to Admission medications    Medication Sig Start Date End Date Taking? Authorizing Provider   mupirocin (BACTROBAN) 2 % ointment Apply topically 2 times daily. 6/4/20  Yes Terrance Card DPM   Calcium Carb-Cholecalciferol 600-800 MG-UNIT CHEW    Yes Aditya Cardoza MD   metoprolol succinate (TOPROL XL) 25 MG extended release tablet  1/8/18  Yes Aditya Cardoza MD   Omega-3 Fatty Acids (FISH OIL PO) Take 2 g by mouth   Yes Aditya Cardoza MD

## 2025-08-13 ENCOUNTER — OFFICE VISIT (OUTPATIENT)
Dept: PODIATRY | Age: 89
End: 2025-08-13
Payer: MEDICARE

## 2025-08-13 VITALS
WEIGHT: 168 LBS | BODY MASS INDEX: 24.88 KG/M2 | HEART RATE: 70 BPM | DIASTOLIC BLOOD PRESSURE: 70 MMHG | HEIGHT: 69 IN | SYSTOLIC BLOOD PRESSURE: 118 MMHG

## 2025-08-13 DIAGNOSIS — L84 CORNS AND CALLOSITIES: ICD-10-CM

## 2025-08-13 DIAGNOSIS — G60.8 HEREDITARY SENSORY NEUROPATHY: Primary | ICD-10-CM

## 2025-08-13 DIAGNOSIS — B35.1 DERMATOPHYTOSIS OF NAIL: ICD-10-CM

## 2025-08-13 PROCEDURE — 99999 PR OFFICE/OUTPT VISIT,PROCEDURE ONLY: CPT | Performed by: PODIATRIST

## 2025-08-13 PROCEDURE — 11721 DEBRIDE NAIL 6 OR MORE: CPT | Performed by: PODIATRIST
